# Patient Record
Sex: FEMALE | Race: WHITE | Employment: UNEMPLOYED | URBAN - METROPOLITAN AREA
[De-identification: names, ages, dates, MRNs, and addresses within clinical notes are randomized per-mention and may not be internally consistent; named-entity substitution may affect disease eponyms.]

---

## 2019-05-08 ENCOUNTER — TELEPHONE (OUTPATIENT)
Dept: INTERNAL MEDICINE CLINIC | Facility: CLINIC | Age: 59
End: 2019-05-08

## 2019-05-08 ENCOUNTER — OFFICE VISIT (OUTPATIENT)
Dept: INTERNAL MEDICINE CLINIC | Facility: CLINIC | Age: 59
End: 2019-05-08
Payer: COMMERCIAL

## 2019-05-08 VITALS
HEART RATE: 111 BPM | TEMPERATURE: 98 F | SYSTOLIC BLOOD PRESSURE: 129 MMHG | HEIGHT: 61 IN | DIASTOLIC BLOOD PRESSURE: 90 MMHG | WEIGHT: 124.81 LBS | BODY MASS INDEX: 23.56 KG/M2

## 2019-05-08 DIAGNOSIS — R10.9 LEFT SIDED ABDOMINAL PAIN: ICD-10-CM

## 2019-05-08 DIAGNOSIS — F10.10 ALCOHOL ABUSE, DAILY USE: ICD-10-CM

## 2019-05-08 DIAGNOSIS — Z12.11 SCREENING FOR COLON CANCER: ICD-10-CM

## 2019-05-08 DIAGNOSIS — K21.9 GASTROESOPHAGEAL REFLUX DISEASE, ESOPHAGITIS PRESENCE NOT SPECIFIED: ICD-10-CM

## 2019-05-08 DIAGNOSIS — Z72.0 TOBACCO ABUSE: ICD-10-CM

## 2019-05-08 DIAGNOSIS — F10.20 UNCOMPLICATED ALCOHOL DEPENDENCE (HCC): ICD-10-CM

## 2019-05-08 DIAGNOSIS — G89.29 CHRONIC PAIN OF LEFT KNEE: Primary | ICD-10-CM

## 2019-05-08 DIAGNOSIS — R19.8 ALTERNATING CONSTIPATION AND DIARRHEA: ICD-10-CM

## 2019-05-08 DIAGNOSIS — M25.552 PAIN OF LEFT HIP JOINT: ICD-10-CM

## 2019-05-08 DIAGNOSIS — M25.562 CHRONIC PAIN OF LEFT KNEE: Primary | ICD-10-CM

## 2019-05-08 PROBLEM — F41.9 ANXIETY: Status: ACTIVE | Noted: 2019-05-08

## 2019-05-08 PROCEDURE — 99204 OFFICE O/P NEW MOD 45 MIN: CPT | Performed by: INTERNAL MEDICINE

## 2019-05-08 PROCEDURE — 99212 OFFICE O/P EST SF 10 MIN: CPT | Performed by: INTERNAL MEDICINE

## 2019-05-08 RX ORDER — OMEPRAZOLE 40 MG/1
40 CAPSULE, DELAYED RELEASE ORAL DAILY
Qty: 90 CAPSULE | Refills: 1 | Status: ON HOLD | OUTPATIENT
Start: 2019-05-08 | End: 2019-12-17

## 2019-05-08 RX ORDER — TRAMADOL HYDROCHLORIDE 50 MG/1
50 TABLET ORAL EVERY 12 HOURS PRN
Qty: 30 TABLET | Refills: 1 | Status: SHIPPED | OUTPATIENT
Start: 2019-05-08 | End: 2019-05-23

## 2019-05-08 RX ORDER — LORAZEPAM 1 MG/1
1 TABLET ORAL DAILY PRN
Qty: 30 TABLET | Refills: 0 | Status: SHIPPED | OUTPATIENT
Start: 2019-05-08 | End: 2019-07-08

## 2019-05-08 NOTE — TELEPHONE ENCOUNTER
Pt's spouse called in stating that per insurance they are requesting Dr. Terese Fierro office contact insurance for approval on the CT Abdomen + Pelvis. Dago Alfaro was informed of this by central scheduling when he attempted to schedule appt. Please advise and call back.

## 2019-05-08 NOTE — ASSESSMENT & PLAN NOTE
Stressors at home. Patient drinking 2 deal with her anxiety and depression. Advised to quit alcohol use. Short supply of lorazepam nightly. Side effects of chronic use of benzodiazepines discussed.   Side effects of medication including drowsiness, somn

## 2019-05-08 NOTE — ASSESSMENT & PLAN NOTE
2 drinks of huseyin per day. Counseled on alcohol abstinence. Informed that it could be causing peptic ulcer disease and her abdominal symptoms. Advised to focus on lifestyle modification. Involving hobbies and activities.   Eat healthy and exercise regu

## 2019-05-08 NOTE — TELEPHONE ENCOUNTER
called stating no rx was provided for pts pain and rx for       LORazepam 1 MG Oral Tab Take 1 tablet (1 mg total) by mouth daily as needed for Anxiety.  Disp: 30 tablet Rfl: 0     Has not been called in please advise

## 2019-05-09 ENCOUNTER — TELEPHONE (OUTPATIENT)
Dept: OTHER | Age: 59
End: 2019-05-09

## 2019-05-09 ENCOUNTER — HOSPITAL ENCOUNTER (OUTPATIENT)
Dept: GENERAL RADIOLOGY | Facility: HOSPITAL | Age: 59
Discharge: HOME | End: 2019-05-09
Attending: INTERNAL MEDICINE
Payer: COMMERCIAL

## 2019-05-09 ENCOUNTER — LAB ENCOUNTER (OUTPATIENT)
Dept: LAB | Facility: HOSPITAL | Age: 59
End: 2019-05-09
Attending: INTERNAL MEDICINE
Payer: COMMERCIAL

## 2019-05-09 DIAGNOSIS — M25.562 CHRONIC PAIN OF LEFT KNEE: ICD-10-CM

## 2019-05-09 DIAGNOSIS — E87.6 HYPOKALEMIA: ICD-10-CM

## 2019-05-09 DIAGNOSIS — R19.8 ALTERNATING CONSTIPATION AND DIARRHEA: ICD-10-CM

## 2019-05-09 DIAGNOSIS — D75.89 MACROCYTOSIS WITHOUT ANEMIA: ICD-10-CM

## 2019-05-09 DIAGNOSIS — R10.9 LEFT SIDED ABDOMINAL PAIN: ICD-10-CM

## 2019-05-09 DIAGNOSIS — E87.6 HYPOKALEMIA: Primary | ICD-10-CM

## 2019-05-09 DIAGNOSIS — F10.20 UNCOMPLICATED ALCOHOL DEPENDENCE (HCC): ICD-10-CM

## 2019-05-09 DIAGNOSIS — M25.552 PAIN OF LEFT HIP JOINT: ICD-10-CM

## 2019-05-09 DIAGNOSIS — G89.29 CHRONIC PAIN OF LEFT KNEE: ICD-10-CM

## 2019-05-09 PROCEDURE — 83735 ASSAY OF MAGNESIUM: CPT

## 2019-05-09 PROCEDURE — 73502 X-RAY EXAM HIP UNI 2-3 VIEWS: CPT | Performed by: INTERNAL MEDICINE

## 2019-05-09 PROCEDURE — 80053 COMPREHEN METABOLIC PANEL: CPT

## 2019-05-09 PROCEDURE — 85060 BLOOD SMEAR INTERPRETATION: CPT

## 2019-05-09 PROCEDURE — 82607 VITAMIN B-12: CPT

## 2019-05-09 PROCEDURE — 82746 ASSAY OF FOLIC ACID SERUM: CPT

## 2019-05-09 PROCEDURE — 73562 X-RAY EXAM OF KNEE 3: CPT | Performed by: INTERNAL MEDICINE

## 2019-05-09 PROCEDURE — 36415 COLL VENOUS BLD VENIPUNCTURE: CPT

## 2019-05-09 PROCEDURE — 85025 COMPLETE CBC W/AUTO DIFF WBC: CPT

## 2019-05-09 NOTE — TELEPHONE ENCOUNTER
Auth required though [employer]. Referral was just entered. Review can take 15 + business days for the authorization. Patient will be notified when approved.     Thank you,  [facility]

## 2019-05-13 ENCOUNTER — TELEPHONE (OUTPATIENT)
Dept: INTERNAL MEDICINE CLINIC | Facility: CLINIC | Age: 59
End: 2019-05-13

## 2019-05-22 ENCOUNTER — LAB ENCOUNTER (OUTPATIENT)
Dept: LAB | Facility: HOSPITAL | Age: 59
End: 2019-05-22
Attending: INTERNAL MEDICINE
Payer: COMMERCIAL

## 2019-05-22 DIAGNOSIS — E87.6 HYPOKALEMIA: ICD-10-CM

## 2019-05-22 LAB
ANION GAP SERPL CALC-SCNC: 7 MMOL/L (ref 0–18)
BUN BLD-MCNC: 6 MG/DL (ref 7–18)
BUN/CREAT SERPL: 10.9 (ref 10–20)
CALCIUM BLD-MCNC: 9.2 MG/DL (ref 8.5–10.1)
CHLORIDE SERPL-SCNC: 109 MMOL/L (ref 98–112)
CO2 SERPL-SCNC: 27 MMOL/L (ref 21–32)
CREAT BLD-MCNC: 0.55 MG/DL (ref 0.55–1.02)
GLUCOSE BLD-MCNC: 94 MG/DL (ref 70–99)
OSMOLALITY SERPL CALC.SUM OF ELEC: 293 MOSM/KG (ref 275–295)
PATIENT FASTING: NO
POTASSIUM SERPL-SCNC: 3.7 MMOL/L (ref 3.5–5.1)
SODIUM SERPL-SCNC: 143 MMOL/L (ref 136–145)

## 2019-05-22 PROCEDURE — 36415 COLL VENOUS BLD VENIPUNCTURE: CPT

## 2019-05-22 PROCEDURE — 80048 BASIC METABOLIC PNL TOTAL CA: CPT

## 2019-06-05 ENCOUNTER — OFFICE VISIT (OUTPATIENT)
Dept: ORTHOPEDICS CLINIC | Facility: CLINIC | Age: 59
End: 2019-06-05
Payer: COMMERCIAL

## 2019-06-05 VITALS — SYSTOLIC BLOOD PRESSURE: 157 MMHG | DIASTOLIC BLOOD PRESSURE: 100 MMHG | HEART RATE: 96 BPM | RESPIRATION RATE: 24 BRPM

## 2019-06-05 DIAGNOSIS — M70.62 TROCHANTERIC BURSITIS OF LEFT HIP: Primary | ICD-10-CM

## 2019-06-05 PROCEDURE — 99212 OFFICE O/P EST SF 10 MIN: CPT | Performed by: ORTHOPAEDIC SURGERY

## 2019-06-05 PROCEDURE — 99203 OFFICE O/P NEW LOW 30 MIN: CPT | Performed by: ORTHOPAEDIC SURGERY

## 2019-06-05 PROCEDURE — 20610 DRAIN/INJ JOINT/BURSA W/O US: CPT | Performed by: ORTHOPAEDIC SURGERY

## 2019-06-05 NOTE — PROGRESS NOTES
Per verbal order from VT, draw up 3ml of Kenalog 10 and 3ml of 1% lidocaine for cortisone injection to left hip.   Niraj Mendoza RN

## 2019-06-05 NOTE — H&P
Chief Complaint: Left hip and knee pain    NURSING INTAKE COMMENTS: Patient presents with:  Consult: left hip and left knee pain -- Xray taken 05/09/19. Onset over 1 year and denies injury. Rates pain 8/10.  Tried Aleve and ibuprofen but stopped due to stom electronic medical record. Pertinent positives and negatives noted in the the HPI. Physical Examination:  There is no height or weight on file to calculate BMI. This 66year old female is A&O in no acute distress.     HIP EXAM: LEFT  RIGHT   Range of

## 2019-06-05 NOTE — PROGRESS NOTES
Pt had high blood pressure both before and after cortisone injection to left hip today. Instructed pt to discuss this with her PCP. Discussed with pt the risks associated with chronic hypertension such as stroke and kidney problems.  Pt verbalized [place]

## 2019-06-05 NOTE — PROCEDURES
Procedure: The risks and benefits of a cortisone injection were discussed with the patient. An informational sheet was also provided and the patient had ample time to review it.   Under sterile preparation, the left hip bursa was injected with 30 mg of Barrera Cadet

## 2019-06-06 ENCOUNTER — TELEPHONE (OUTPATIENT)
Dept: CASE MANAGEMENT | Age: 59
End: 2019-06-06

## 2019-06-06 NOTE — TELEPHONE ENCOUNTER
Shane Bradford,     The CT you ordered for this patient requests a peer discussion. To do so, please call [phone] option 4. If this can't be done, please have you're clinical staff contact the patient with alternative medical measures.     Thank you,   Gladys Antoine

## 2019-06-07 ENCOUNTER — TELEPHONE (OUTPATIENT)
Dept: CASE MANAGEMENT | Age: 59
End: 2019-06-07

## 2019-06-07 NOTE — TELEPHONE ENCOUNTER
Shane Michel has been denied for the following reason. This can still be overturned by peer review, please see message sent yesterday for this information.  Patient will be notified and advised to contact you directly for alternative medical measure

## 2019-06-10 NOTE — TELEPHONE ENCOUNTER
Please inform the patient to follow-up with GI for left sided abdominal pain and for screening colonoscopy. Referral was given during last office visit. ..  The CT scan was not prior authorized.

## 2019-06-11 NOTE — TELEPHONE ENCOUNTER
Spoke with patient and relayed 's message.  Patient verbalized understanding and stated will make appointment with GI

## 2019-07-09 RX ORDER — LORAZEPAM 1 MG/1
TABLET ORAL
Qty: 30 TABLET | Refills: 0 | OUTPATIENT
Start: 2019-07-09 | End: 2021-01-06

## 2019-07-09 NOTE — TELEPHONE ENCOUNTER
Review pended refill request as it does not fall under a protocol.     Last Rx: 5/8/19 #30  Requested Prescriptions     Pending Prescriptions Disp Refills   • LORAZEPAM 1 MG Oral Tab [Pharmacy Med Name: LORAZEPAM   1MG     TAB] 30 tablet 0     Sig: TAKE 1 T

## 2019-11-28 ENCOUNTER — ANESTHESIA (OUTPATIENT)
Dept: SURGERY | Facility: HOSPITAL | Age: 59
DRG: 853 | End: 2019-11-28

## 2019-11-28 ENCOUNTER — APPOINTMENT (OUTPATIENT)
Dept: GENERAL RADIOLOGY | Facility: HOSPITAL | Age: 59
DRG: 853 | End: 2019-11-28
Attending: UROLOGY

## 2019-11-28 ENCOUNTER — APPOINTMENT (OUTPATIENT)
Dept: CT IMAGING | Facility: HOSPITAL | Age: 59
DRG: 853 | End: 2019-11-28
Attending: EMERGENCY MEDICINE

## 2019-11-28 ENCOUNTER — HOSPITAL ENCOUNTER (INPATIENT)
Facility: HOSPITAL | Age: 59
LOS: 9 days | Discharge: HOME | DRG: 853 | End: 2019-12-07
Attending: EMERGENCY MEDICINE | Admitting: HOSPITALIST

## 2019-11-28 ENCOUNTER — ANESTHESIA EVENT (OUTPATIENT)
Dept: SURGERY | Facility: HOSPITAL | Age: 59
DRG: 853 | End: 2019-11-28

## 2019-11-28 ENCOUNTER — APPOINTMENT (OUTPATIENT)
Dept: GENERAL RADIOLOGY | Facility: HOSPITAL | Age: 59
DRG: 853 | End: 2019-11-28
Attending: EMERGENCY MEDICINE

## 2019-11-28 DIAGNOSIS — N39.0 SEPSIS DUE TO URINARY TRACT INFECTION (HCC): ICD-10-CM

## 2019-11-28 DIAGNOSIS — A41.9 SEPSIS (HCC): ICD-10-CM

## 2019-11-28 DIAGNOSIS — A41.9 SEPSIS DUE TO URINARY TRACT INFECTION (HCC): ICD-10-CM

## 2019-11-28 DIAGNOSIS — N20.0 KIDNEY STONE: ICD-10-CM

## 2019-11-28 DIAGNOSIS — N30.00 ACUTE CYSTITIS WITHOUT HEMATURIA: Primary | ICD-10-CM

## 2019-11-28 DIAGNOSIS — E87.6 HYPOKALEMIA: ICD-10-CM

## 2019-11-28 DIAGNOSIS — F10.230 ALCOHOL WITHDRAWAL SYNDROME WITHOUT COMPLICATION (HCC): ICD-10-CM

## 2019-11-28 PROBLEM — F10.930 ALCOHOL WITHDRAWAL SYNDROME WITHOUT COMPLICATION (HCC): Status: ACTIVE | Noted: 2019-11-28

## 2019-11-28 PROCEDURE — 71046 X-RAY EXAM CHEST 2 VIEWS: CPT | Performed by: EMERGENCY MEDICINE

## 2019-11-28 PROCEDURE — 99253 IP/OBS CNSLTJ NEW/EST LOW 45: CPT | Performed by: UROLOGY

## 2019-11-28 PROCEDURE — 99223 1ST HOSP IP/OBS HIGH 75: CPT | Performed by: HOSPITALIST

## 2019-11-28 PROCEDURE — 0T778DZ DILATION OF LEFT URETER WITH INTRALUMINAL DEVICE, VIA NATURAL OR ARTIFICIAL OPENING ENDOSCOPIC: ICD-10-PCS | Performed by: UROLOGY

## 2019-11-28 PROCEDURE — BT1F1ZZ FLUOROSCOPY OF LEFT KIDNEY, URETER AND BLADDER USING LOW OSMOLAR CONTRAST: ICD-10-PCS | Performed by: UROLOGY

## 2019-11-28 PROCEDURE — 74177 CT ABD & PELVIS W/CONTRAST: CPT | Performed by: EMERGENCY MEDICINE

## 2019-11-28 PROCEDURE — 74420 UROGRAPHY RTRGR +-KUB: CPT | Performed by: UROLOGY

## 2019-11-28 PROCEDURE — 52332 CYSTOSCOPY AND TREATMENT: CPT | Performed by: UROLOGY

## 2019-11-28 DEVICE — STENT URET 6F 24CM ULSMTH: Type: IMPLANTABLE DEVICE | Status: FUNCTIONAL

## 2019-11-28 RX ORDER — HYDROCODONE BITARTRATE AND ACETAMINOPHEN 5; 325 MG/1; MG/1
1 TABLET ORAL AS NEEDED
Status: DISCONTINUED | OUTPATIENT
Start: 2019-11-28 | End: 2019-11-28 | Stop reason: HOSPADM

## 2019-11-28 RX ORDER — POTASSIUM CHLORIDE 14.9 MG/ML
20 INJECTION INTRAVENOUS ONCE
Status: COMPLETED | OUTPATIENT
Start: 2019-11-28 | End: 2019-11-28

## 2019-11-28 RX ORDER — ACETAMINOPHEN 325 MG/1
650 TABLET ORAL EVERY 6 HOURS PRN
Status: DISCONTINUED | OUTPATIENT
Start: 2019-11-28 | End: 2019-11-30

## 2019-11-28 RX ORDER — HYDROMORPHONE HYDROCHLORIDE 1 MG/ML
0.6 INJECTION, SOLUTION INTRAMUSCULAR; INTRAVENOUS; SUBCUTANEOUS EVERY 5 MIN PRN
Status: DISCONTINUED | OUTPATIENT
Start: 2019-11-28 | End: 2019-11-28 | Stop reason: HOSPADM

## 2019-11-28 RX ORDER — HYDROMORPHONE HYDROCHLORIDE 1 MG/ML
0.4 INJECTION, SOLUTION INTRAMUSCULAR; INTRAVENOUS; SUBCUTANEOUS EVERY 5 MIN PRN
Status: DISCONTINUED | OUTPATIENT
Start: 2019-11-28 | End: 2019-11-28 | Stop reason: HOSPADM

## 2019-11-28 RX ORDER — SODIUM CHLORIDE 9 MG/ML
INJECTION, SOLUTION INTRAVENOUS CONTINUOUS
Status: DISCONTINUED | OUTPATIENT
Start: 2019-11-28 | End: 2019-11-30

## 2019-11-28 RX ORDER — MORPHINE SULFATE 2 MG/ML
1 INJECTION, SOLUTION INTRAMUSCULAR; INTRAVENOUS EVERY 2 HOUR PRN
Status: DISCONTINUED | OUTPATIENT
Start: 2019-11-28 | End: 2019-11-29

## 2019-11-28 RX ORDER — IPRATROPIUM BROMIDE AND ALBUTEROL SULFATE 2.5; .5 MG/3ML; MG/3ML
3 SOLUTION RESPIRATORY (INHALATION) ONCE
Status: COMPLETED | OUTPATIENT
Start: 2019-11-28 | End: 2019-11-28

## 2019-11-28 RX ORDER — LORAZEPAM 2 MG/ML
2 INJECTION INTRAMUSCULAR
Status: DISCONTINUED | OUTPATIENT
Start: 2019-11-28 | End: 2019-12-05

## 2019-11-28 RX ORDER — ONDANSETRON 2 MG/ML
4 INJECTION INTRAMUSCULAR; INTRAVENOUS EVERY 6 HOURS PRN
Status: DISCONTINUED | OUTPATIENT
Start: 2019-11-28 | End: 2019-12-07

## 2019-11-28 RX ORDER — MULTIPLE VITAMINS W/ MINERALS TAB 9MG-400MCG
1 TAB ORAL DAILY
Status: DISCONTINUED | OUTPATIENT
Start: 2019-11-28 | End: 2019-12-07

## 2019-11-28 RX ORDER — LIDOCAINE HYDROCHLORIDE 10 MG/ML
INJECTION, SOLUTION EPIDURAL; INFILTRATION; INTRACAUDAL; PERINEURAL AS NEEDED
Status: DISCONTINUED | OUTPATIENT
Start: 2019-11-28 | End: 2019-11-28 | Stop reason: SURG

## 2019-11-28 RX ORDER — NICOTINE 21 MG/24HR
1 PATCH, TRANSDERMAL 24 HOURS TRANSDERMAL DAILY
Status: DISCONTINUED | OUTPATIENT
Start: 2019-11-28 | End: 2019-12-07

## 2019-11-28 RX ORDER — HALOPERIDOL 5 MG/ML
0.25 INJECTION INTRAMUSCULAR ONCE AS NEEDED
Status: DISCONTINUED | OUTPATIENT
Start: 2019-11-28 | End: 2019-11-28 | Stop reason: HOSPADM

## 2019-11-28 RX ORDER — HYDROMORPHONE HYDROCHLORIDE 1 MG/ML
0.2 INJECTION, SOLUTION INTRAMUSCULAR; INTRAVENOUS; SUBCUTANEOUS EVERY 5 MIN PRN
Status: DISCONTINUED | OUTPATIENT
Start: 2019-11-28 | End: 2019-11-28 | Stop reason: HOSPADM

## 2019-11-28 RX ORDER — MORPHINE SULFATE 2 MG/ML
2 INJECTION, SOLUTION INTRAMUSCULAR; INTRAVENOUS EVERY 2 HOUR PRN
Status: DISCONTINUED | OUTPATIENT
Start: 2019-11-28 | End: 2019-11-29

## 2019-11-28 RX ORDER — SODIUM CHLORIDE 0.9 % (FLUSH) 0.9 %
3 SYRINGE (ML) INJECTION AS NEEDED
Status: DISCONTINUED | OUTPATIENT
Start: 2019-11-28 | End: 2019-12-07

## 2019-11-28 RX ORDER — LORAZEPAM 1 MG/1
2 TABLET ORAL
Status: DISCONTINUED | OUTPATIENT
Start: 2019-11-28 | End: 2019-12-05

## 2019-11-28 RX ORDER — SODIUM CHLORIDE, SODIUM LACTATE, POTASSIUM CHLORIDE, CALCIUM CHLORIDE 600; 310; 30; 20 MG/100ML; MG/100ML; MG/100ML; MG/100ML
INJECTION, SOLUTION INTRAVENOUS CONTINUOUS
Status: DISCONTINUED | OUTPATIENT
Start: 2019-11-28 | End: 2019-11-28 | Stop reason: HOSPADM

## 2019-11-28 RX ORDER — MORPHINE SULFATE 4 MG/ML
4 INJECTION, SOLUTION INTRAMUSCULAR; INTRAVENOUS EVERY 2 HOUR PRN
Status: DISCONTINUED | OUTPATIENT
Start: 2019-11-28 | End: 2019-11-29

## 2019-11-28 RX ORDER — FOLIC ACID 1 MG/1
1 TABLET ORAL DAILY
Status: DISCONTINUED | OUTPATIENT
Start: 2019-11-28 | End: 2019-12-07

## 2019-11-28 RX ORDER — NALOXONE HYDROCHLORIDE 0.4 MG/ML
80 INJECTION, SOLUTION INTRAMUSCULAR; INTRAVENOUS; SUBCUTANEOUS AS NEEDED
Status: DISCONTINUED | OUTPATIENT
Start: 2019-11-28 | End: 2019-11-28 | Stop reason: HOSPADM

## 2019-11-28 RX ORDER — DEXAMETHASONE SODIUM PHOSPHATE 4 MG/ML
VIAL (ML) INJECTION AS NEEDED
Status: DISCONTINUED | OUTPATIENT
Start: 2019-11-28 | End: 2019-11-28 | Stop reason: SURG

## 2019-11-28 RX ORDER — MORPHINE SULFATE 4 MG/ML
2 INJECTION, SOLUTION INTRAMUSCULAR; INTRAVENOUS ONCE
Status: COMPLETED | OUTPATIENT
Start: 2019-11-28 | End: 2019-11-28

## 2019-11-28 RX ORDER — LORAZEPAM 1 MG/1
1 TABLET ORAL
Status: DISCONTINUED | OUTPATIENT
Start: 2019-11-28 | End: 2019-12-05

## 2019-11-28 RX ORDER — MORPHINE SULFATE 4 MG/ML
4 INJECTION, SOLUTION INTRAMUSCULAR; INTRAVENOUS EVERY 10 MIN PRN
Status: DISCONTINUED | OUTPATIENT
Start: 2019-11-28 | End: 2019-11-28 | Stop reason: HOSPADM

## 2019-11-28 RX ORDER — MORPHINE SULFATE 2 MG/ML
2 INJECTION, SOLUTION INTRAMUSCULAR; INTRAVENOUS EVERY 10 MIN PRN
Status: DISCONTINUED | OUTPATIENT
Start: 2019-11-28 | End: 2019-11-28 | Stop reason: HOSPADM

## 2019-11-28 RX ORDER — ACETAMINOPHEN 325 MG/1
325 TABLET ORAL EVERY 6 HOURS PRN
COMMUNITY
End: 2019-12-13

## 2019-11-28 RX ORDER — ONDANSETRON 2 MG/ML
4 INJECTION INTRAMUSCULAR; INTRAVENOUS ONCE AS NEEDED
Status: DISCONTINUED | OUTPATIENT
Start: 2019-11-28 | End: 2019-11-28 | Stop reason: HOSPADM

## 2019-11-28 RX ORDER — ONDANSETRON 2 MG/ML
INJECTION INTRAMUSCULAR; INTRAVENOUS AS NEEDED
Status: DISCONTINUED | OUTPATIENT
Start: 2019-11-28 | End: 2019-11-28 | Stop reason: SURG

## 2019-11-28 RX ORDER — TEMAZEPAM 7.5 MG/1
15 CAPSULE ORAL NIGHTLY PRN
Status: DISCONTINUED | OUTPATIENT
Start: 2019-11-28 | End: 2019-12-07

## 2019-11-28 RX ORDER — METOCLOPRAMIDE HYDROCHLORIDE 5 MG/ML
10 INJECTION INTRAMUSCULAR; INTRAVENOUS EVERY 8 HOURS PRN
Status: DISCONTINUED | OUTPATIENT
Start: 2019-11-28 | End: 2019-12-07

## 2019-11-28 RX ORDER — MIDAZOLAM HYDROCHLORIDE 1 MG/ML
INJECTION INTRAMUSCULAR; INTRAVENOUS AS NEEDED
Status: DISCONTINUED | OUTPATIENT
Start: 2019-11-28 | End: 2019-11-28 | Stop reason: SURG

## 2019-11-28 RX ORDER — LORAZEPAM 2 MG/ML
1 INJECTION INTRAMUSCULAR
Status: DISCONTINUED | OUTPATIENT
Start: 2019-11-28 | End: 2019-12-05

## 2019-11-28 RX ORDER — LORAZEPAM 2 MG/ML
1 INJECTION INTRAMUSCULAR ONCE
Status: COMPLETED | OUTPATIENT
Start: 2019-11-28 | End: 2019-11-28

## 2019-11-28 RX ORDER — HYDROCODONE BITARTRATE AND ACETAMINOPHEN 5; 325 MG/1; MG/1
2 TABLET ORAL AS NEEDED
Status: DISCONTINUED | OUTPATIENT
Start: 2019-11-28 | End: 2019-11-28 | Stop reason: HOSPADM

## 2019-11-28 RX ORDER — MELATONIN
100 DAILY
Status: DISCONTINUED | OUTPATIENT
Start: 2019-11-29 | End: 2019-12-07

## 2019-11-28 RX ORDER — SODIUM CHLORIDE, SODIUM LACTATE, POTASSIUM CHLORIDE, CALCIUM CHLORIDE 600; 310; 30; 20 MG/100ML; MG/100ML; MG/100ML; MG/100ML
INJECTION, SOLUTION INTRAVENOUS CONTINUOUS PRN
Status: DISCONTINUED | OUTPATIENT
Start: 2019-11-28 | End: 2019-11-28 | Stop reason: SURG

## 2019-11-28 RX ORDER — SODIUM CHLORIDE 9 MG/ML
INJECTION, SOLUTION INTRAVENOUS CONTINUOUS
Status: ACTIVE | OUTPATIENT
Start: 2019-11-28 | End: 2019-11-28

## 2019-11-28 RX ORDER — PROCHLORPERAZINE EDISYLATE 5 MG/ML
5 INJECTION INTRAMUSCULAR; INTRAVENOUS ONCE AS NEEDED
Status: DISCONTINUED | OUTPATIENT
Start: 2019-11-28 | End: 2019-11-28 | Stop reason: HOSPADM

## 2019-11-28 RX ORDER — MORPHINE SULFATE 10 MG/ML
6 INJECTION, SOLUTION INTRAMUSCULAR; INTRAVENOUS EVERY 10 MIN PRN
Status: DISCONTINUED | OUTPATIENT
Start: 2019-11-28 | End: 2019-11-28 | Stop reason: HOSPADM

## 2019-11-28 RX ADMIN — DEXAMETHASONE SODIUM PHOSPHATE 4 MG: 4 MG/ML VIAL (ML) INJECTION at 16:23:00

## 2019-11-28 RX ADMIN — ONDANSETRON 4 MG: 2 INJECTION INTRAMUSCULAR; INTRAVENOUS at 16:23:00

## 2019-11-28 RX ADMIN — MIDAZOLAM HYDROCHLORIDE 2 MG: 1 INJECTION INTRAMUSCULAR; INTRAVENOUS at 16:19:00

## 2019-11-28 RX ADMIN — LIDOCAINE HYDROCHLORIDE 50 MG: 10 INJECTION, SOLUTION EPIDURAL; INFILTRATION; INTRACAUDAL; PERINEURAL at 16:23:00

## 2019-11-28 RX ADMIN — SODIUM CHLORIDE, SODIUM LACTATE, POTASSIUM CHLORIDE, CALCIUM CHLORIDE: 600; 310; 30; 20 INJECTION, SOLUTION INTRAVENOUS at 16:12:00

## 2019-11-28 NOTE — ED NOTES
Patient complaining of pain and urge to urinate. Also getting frustrated while trying to take out all her piercings, HR rising.  Morphine ordered

## 2019-11-28 NOTE — ANESTHESIA PROCEDURE NOTES
Airway  Urgency: Elective      General Information and Staff    Patient location during procedure: OR  Anesthesiologist: Ba Romano MD  Performed: anesthesiologist     Indications and Patient Condition  Indications for airway management: anesthesia  Se

## 2019-11-28 NOTE — SEPSIS REASSESSMENT
[facility]    Sepsis Reassessment Note    BP (!) 134/96   Pulse 109   Temp 98.7 °F (37.1 °C) (Oral)   Resp (!) 28   Ht 154.9 cm (5' 1\")   Wt 50.3 kg   LMP  (LMP Unknown)   SpO2 95%   BMI 20.97 kg/m²      2:20 PM    Cardiac:  Regularity:

## 2019-11-28 NOTE — ANESTHESIA PREPROCEDURE EVALUATION
Anesthesia PreOp Note    HPI:     Feliciano Alexander is a 64year old female who presents for preoperative consultation requested by: Liu Shipman MD    Date of Surgery: 11/28/2019    Procedure(s):  CYSTOSCOPY STENT INSERTION  Indication: Sepsis ([facility] [address] [Z5 (GARAMYCIN) 120 mg in sodium chloride 0.9% 100 mL IVPB, 120 mg, Intravenous, Once, Redd Valdez MD, Last Rate: 100 mL/hr at 11/28/19 1526, 120 mg at 11/28/19 1526    No current Epic-ordered outpatient medications on file.       No Known Allergies    F organizations: Not on file        Relationship status: Not on file      Intimate partner violence:        Fear of current or ex partner: Not on file        Emotionally abused: Not on file        Physically abused: Not on file        Forced sexual activity: Anesthesia Evaluation     Patient summary reviewed    Airway   Mallampati: II  TM distance: >3 FB  Neck ROM: full  Dental - normal exam     Pulmonary - negative ROS and normal exam   Cardiovascular - normal exam    ECG reviewed    Neuro/Psych - negative RO

## 2019-11-28 NOTE — CONSULTS
[facility] Urology  Report of Initial Consultation    Ariana Board Patient Status:  Emergency    10/22/1960 MRN [id number]   Location [address] Attending Nohelia Talley MD   [facility] Day # 0 Past Surgical History:  History reviewed. No pertinent surgical history.     Family History:  Family History   Problem Relation Age of Onset   • Diabetes Maternal Grandmother    • Eye Problems Maternal Grandmother    • Breast Cancer Mother 61   • Jossue Sport Neurologic: No focal neurological deficits. Integument: No lesions. No erythema. Psychiatric: Appropriate mood and affect.     Results:     Laboratory Data:  Recent Labs   Lab 11/28/19  1211   WBC 8.3   HGB 16.1*   HCT 45.7   .0*     Recent Labs stone treatment will be deferred to a later date until she is stable and her infection is treated. Benefits, risks, possible complications and alternatives were discussed with the patient.  She elects to proceed with cystoscopy and left ureteral JJ stent pl

## 2019-11-28 NOTE — ANESTHESIA POSTPROCEDURE EVALUATION
Patient: Dino Mcclure    Procedure Summary     Date:  11/28/19 Room / Location:  [address] [place] [address] MAIN OR 14 / [address] [place] [address] MAIN OR    Anesthesia Start:  9077 Anesthesia Stop:      Procedure:  CYSTOSCOPY STENT INSERTION (Left ) Diagnosis:       Sepsis ([facility] [address]      (Sepsis ([facility] [address]

## 2019-11-28 NOTE — OPERATIVE REPORT
[facility] - [facility]   Urology Operative Note     Diana Perez Location: OR   [facility] [id number] MRN [id number]   Admission Date 11/28/2019 Operation Date 11/28/2019   Service Urology Surgeon Ev Arango MD      Primary Surgeon: Ev Arango MD monitoring devices were connected to the patient. Successful induction of general level LMA anesthesia was achieved. IV antibiotics were administered for surgical prophylaxis (IV Zosyn and gentamicin in the ER).  The patient was then positioned in dorsal li collecting system decompression in the meantime. 10 mL's of sterile water used to inflate the balloon. A StatLock was applied to the left thigh.   The patient was repositioned supine, general anesthesia was reversed, and she was successfully extubated and

## 2019-11-28 NOTE — ED NOTES
Fevers for the past 3 days. States she has been nauseous with a productive cough. Clear white sputum.  states she had a tooth removed years ago and sometimes the area swells and gets tight.

## 2019-11-28 NOTE — H&P
[facility]    PATIENT'S NAME: Polina Traylor   ATTENDING PHYSICIAN: Aida Sharma MD   PATIENT ACCOUNT#:   [id number]    LOCATION:  [place] [address]  MEDICAL RECORD #:   [id number]       YOB: 1960  ADMISSION DATE:       11/28/20 Oropharynx clear, dry mucous membranes. Eyes:  Anicteric sclerae. Pupils equal, round, and reactive to light and accommodation. Extraocular muscle movements intact. Ears, nose normal.  NECK:  Supple. No lymphadenopathy. Trachea midline.   LUNGS:  Phoebe

## 2019-11-28 NOTE — ED PROVIDER NOTES
Patient Seen in: [facility] Emergency Department      History   Patient presents with:  Fever (infectious)    Stated Complaint: fever    HPI    Patient is a 63-year-old female who presents with 3 days of left lower quadrant abdominal pain and feve equal, round, and reactive to light. Neck:      Musculoskeletal: Normal range of motion and neck supple. Vascular: No JVD. Cardiovascular:      Rate and Rhythm: Regular rhythm. Tachycardia present. Heart sounds: Normal heart sounds.  No murmur All other components within normal limits   CBC W/ DIFFERENTIAL - Abnormal; Notable for the following components:    HGB 16.1 (*)     .6 (*)     MCH 42.1 (*)     RDW-SD 57.6 (*)     .0 (*)     All other components within normal limits   PT Chest (cpt=71046)    Result Date: 11/28/2019  CONCLUSION: Normal examination.      Dictated by (CST): Jalen De Anda MD on 11/28/2019 at 14:24     Approved by (CST): Jalen De Anda MD on 11/28/2019 at 14:25          Ct Abdomen Pelvis Iv Co

## 2019-11-28 NOTE — ED NOTES
Orders for admission, patient is aware of plan and ready to go upstairs.  Any questions, please call ED RN Nikki Perez  at extension [phone]

## 2019-11-28 NOTE — ED NOTES
Preliminary differential neut - 43, bands - 43, lymph - 11, mono - 3, review macrocytosis, per Guillermo Meriwether

## 2019-11-29 PROCEDURE — 99233 SBSQ HOSP IP/OBS HIGH 50: CPT | Performed by: HOSPITALIST

## 2019-11-29 PROCEDURE — 99231 SBSQ HOSP IP/OBS SF/LOW 25: CPT | Performed by: UROLOGY

## 2019-11-29 RX ORDER — MAGNESIUM OXIDE 400 MG (241.3 MG MAGNESIUM) TABLET
800 TABLET ONCE
Status: COMPLETED | OUTPATIENT
Start: 2019-11-29 | End: 2019-11-29

## 2019-11-29 RX ORDER — IPRATROPIUM BROMIDE AND ALBUTEROL SULFATE 2.5; .5 MG/3ML; MG/3ML
3 SOLUTION RESPIRATORY (INHALATION) ONCE
Status: COMPLETED | OUTPATIENT
Start: 2019-11-29 | End: 2019-11-29

## 2019-11-29 RX ORDER — IPRATROPIUM BROMIDE AND ALBUTEROL SULFATE 2.5; .5 MG/3ML; MG/3ML
3 SOLUTION RESPIRATORY (INHALATION) EVERY 4 HOURS PRN
Status: DISCONTINUED | OUTPATIENT
Start: 2019-11-29 | End: 2019-12-01

## 2019-11-29 RX ORDER — MAGNESIUM OXIDE 400 MG (241.3 MG MAGNESIUM) TABLET
400 TABLET 2 TIMES DAILY WITH MEALS
Status: DISCONTINUED | OUTPATIENT
Start: 2019-11-29 | End: 2019-12-07

## 2019-11-29 RX ORDER — ALFUZOSIN HYDROCHLORIDE 10 MG/1
10 TABLET, EXTENDED RELEASE ORAL
Status: DISCONTINUED | OUTPATIENT
Start: 2019-11-29 | End: 2019-12-07

## 2019-11-29 NOTE — PLAN OF CARE
Patient urine output dark nga in the morning and lighter as the day progressed. Continued to strain urine. Urine output adequate. Patient c/o sore throat in the morning, no swallowing deficits.  Patient states she has not been eating much over the past co assistance  - Assess pain using appropriate pain scale  - Administer analgesics based on type and severity of pain and evaluate response  - Implement non-pharmacological measures as appropriate and evaluate response  - Consider cultural and social influenc care, etc)  - Arrange for interpreters to assist at discharge as needed  - Consider post-discharge preferences of patient/family/discharge partner  - Complete POLST form as appropriate  - Assess patient's ability to be responsible for managing their own he Assess for signs and symptoms of bleeding or hemorrhage  - Monitor labs and vital signs for trends  - Administer supportive blood products/factors, fluids and medications as ordered and appropriate  - Administer supportive blood products/factors as ordered

## 2019-11-29 NOTE — PROGRESS NOTES
[facility]    Progress Note    Angelic Britt Patient Status:  Inpatient    10/22/1960 MRN [id number]   Location [facility] 2W/SW Attending Ellen Horne MD   Hosp Day # 1 PCP Jerri Restrepo MD       Subjective:     Better addiction  -Patch  -Counseling    DVT proph: SCD    Dispo: PCU    D/w Dr. Garrett Norwood    D/w pt, her  at the bedside, and RN          Results:     Lab Results   Component Value Date    WBC 6.7 11/29/2019    HGB 12.0 11/29/2019    HCT 34.2 (L) 11/29/201 Electronically signed on 11/28/2019 at 16:56 by Loyd Tay MD  11/29/2019

## 2019-11-29 NOTE — PLAN OF CARE
Problem: GENITOURINARY - ADULT  Goal: Absence of urinary retention  Description  INTERVENTIONS:  - Assess patient’s ability to void and empty bladder  - Monitor intake/output and perform bladder scan as needed  - Follow urinary retention protocol/standar OT/PT consult to assist with strengthening/mobility  - Encourage toileting schedule  Outcome: Progressing     Problem: DISCHARGE PLANNING  Goal: Discharge to home or other facility with appropriate resources  Description  INTERVENTIONS:  - Identify barrier

## 2019-11-29 NOTE — DIETARY NOTE
ADULT NUTRITION BRIEF NOTE:    Received RN consult r/t screened at nutritional risk d/t reported 5# wt loss. Pt reports appetite/intake WNL until onset of current illness, ~3 days ago. Reported -120#. Appears well nourished with BMI WNL.  PO diet ini

## 2019-11-29 NOTE — PROGRESS NOTES
[facility]  Urology Consult Follow-Up Note    Kee Lord Patient Status:  Inpatient    10/22/1960 MRN [id number]   Location [facility] 2W/SW Attending Justice Rubinstein, MD   Hosp Day # 1 PCP Chris Kebede 11/28/2019  CONCLUSION:  1. Mildly obstructing left UVJ calculus measuring 4 mm in diameter. 2. Small nonobstructing calculi in the left kidney. 3. Diffuse hepatic steatosis.     Dictated by (CST): Tiana De Anda MD on 11/28/2019 at 13:16     Appro

## 2019-11-29 NOTE — CM/SW NOTE
SW received MDO for substance abuse/ETOH. SW spoke to RN/Rula notifying psych to consult and provide guidance and resources. SW/CM to remain available for support and/or discharge planning.      [address] [place], [place] [id number]

## 2019-11-29 NOTE — H&P
[facility]    PATIENT'S NAME: Mary Bethyao Court   ATTENDING PHYSICIAN: Enrique Hallman MD   PATIENT ACCOUNT#:   [id number]    LOCATION:  [address] [place] [address] RECORD #:   [id number]       YOB: 1960  ADMISSION DATE:       11/28/ blood pressure 134/96, pulse ox 95% on room air. HEENT:  Atraumatic. Oropharynx clear, dry mucous membranes. Eyes:  Anicteric sclerae. Pupils equal, round, and reactive to light and accommodation. Extraocular muscle movements intact.   Ears, nose brennen

## 2019-11-30 ENCOUNTER — APPOINTMENT (OUTPATIENT)
Dept: GENERAL RADIOLOGY | Facility: HOSPITAL | Age: 59
DRG: 853 | End: 2019-11-30
Attending: HOSPITALIST

## 2019-11-30 PROCEDURE — 99223 1ST HOSP IP/OBS HIGH 75: CPT | Performed by: INTERNAL MEDICINE

## 2019-11-30 PROCEDURE — 71045 X-RAY EXAM CHEST 1 VIEW: CPT | Performed by: HOSPITALIST

## 2019-11-30 PROCEDURE — 99233 SBSQ HOSP IP/OBS HIGH 50: CPT | Performed by: HOSPITALIST

## 2019-11-30 PROCEDURE — 99232 SBSQ HOSP IP/OBS MODERATE 35: CPT | Performed by: UROLOGY

## 2019-11-30 RX ORDER — ACETYLCYSTEINE 200 MG/ML
70 SOLUTION ORAL; RESPIRATORY (INHALATION) EVERY 4 HOURS
Status: DISPENSED | OUTPATIENT
Start: 2019-11-30 | End: 2019-12-03

## 2019-11-30 RX ORDER — POTASSIUM CHLORIDE 20 MEQ/1
40 TABLET, EXTENDED RELEASE ORAL EVERY 4 HOURS
Status: ACTIVE | OUTPATIENT
Start: 2019-11-30 | End: 2019-11-30

## 2019-11-30 RX ORDER — ACETYLCYSTEINE 200 MG/ML
140 SOLUTION ORAL; RESPIRATORY (INHALATION) ONCE
Status: COMPLETED | OUTPATIENT
Start: 2019-11-30 | End: 2019-11-30

## 2019-11-30 RX ORDER — SODIUM CHLORIDE 9 MG/ML
INJECTION, SOLUTION INTRAVENOUS CONTINUOUS
Status: DISCONTINUED | OUTPATIENT
Start: 2019-11-30 | End: 2019-12-05

## 2019-11-30 NOTE — CONSULTS
[facility]  Report of Consultation    Diana Perez Patient Status:  Inpatient    10/22/1960 MRN [id number]   Location [facility] 2W/SW Attending Adarsh Carter MD   Hosp Day # 2 PCP Kelton Palacio MD     Reason for Consultation: Maternal Grandfather         epilepsy   • Dementia Paternal Grandfather    • Colon Cancer Paternal Grandfather    • Pulmonary Disease Paternal Grandfather    • Infectious Disease Paternal Grandfather         TB   • Hypertension Other         family h/o minerals (ADULT) tab 1 tablet, 1 tablet, Oral, Daily  •  folic acid (FOLVITE) tab 1 mg, 1 mg, Oral, Daily    Physical Exam:  Blood pressure 131/85, pulse 73, temperature 98.4 °F (36.9 °C), temperature source Temporal, resp.  rate (!) 28, height 5' 1\" (1.54 for liver failure. Patient should stop consuming all alcohol. I have advised that she discontinue Tylenol as well. Plan  - lab work now, trend AST/ALT  - liver ultrasound  - stop tylenol  - stop all alcohol/rehab program      Keeley Stoddard  11/30

## 2019-11-30 NOTE — PROGRESS NOTES
[facility] - [facility]    Progress Note    Chrissy Pod Patient Status:  Inpatient    10/22/1960 MRN [id number]   Location [facility] 2W/SW Attending Davis Donovan MD   Hosp Day # 2 PCP Aj Ontiveros MD       Subjective:     Had a outpatient setting  -Repeat blood culture  -ID consult    ETOH dependence, withdrawal  -CIWA, placed on Precedex drip at night  -SW for resources  -MVI/thiamine/FA    Thrombocytopenia  Suspect combination of sepsis and ETOH  -Stable, monitor for now    Abn

## 2019-11-30 NOTE — PROGRESS NOTES
[facility]  Urology Consult Follow-Up Note    Redge Scale Patient Status:  Inpatient    10/22/1960 MRN [id number]   Location [facility] 2W/SW Attending Benny Baptiste MD   Hosp Day # 2 PCP Daisy King >=32 Resistant      Ampicillin + Sulbactam >=32 Resistant      Cefazolin 16 Sensitive      Ciprofloxacin <=0.25 Sensitive      Gentamicin <=1 Sensitive      Meropenem <=0.25 Sensitive      Levofloxacin <=0.12 Sensitive      Nitrofurantoin <=16 Sensitive or further imaging. Rest of care per primary team.    Will require definitive stone management at a later date in an outpatient setting. Discussed with patient,  at bedside, RN, and hospitalist Dr. Rakesh Buckner.     At the end of the visit, 25 min

## 2019-11-30 NOTE — PLAN OF CARE
Patient in notable respiratory distress and tachycardic during start of shift. POC discussed with Dr. Theresa Alexander and Dr. Albarran Parents throughout night. IVF rate decreased r/t respiratory distress, O2 demand increased, neb treatments, Precedex drip initiated.  See CIW cultural and social influences on pain and pain management  - Manage/alleviate anxiety  - Utilize distraction and/or relaxation techniques  - Monitor for opioid side effects  - Notify MD/LIP if interventions unsuccessful or patient reports new pain  - Anti patient needs post-hospital services based on physician/LIP order or complex needs related to functional status, cognitive ability or social support system  Outcome: Progressing     Problem: Altered Communication/Language Barrier  Goal: Patient/Family is a platelets)  INTERVENTIONS:  - Avoid intramuscular injections, enemas and rectal medication administration  - Ensure safe mobilization of patient  - Hold pressure on venipuncture sites to achieve adequate hemostasis  - Assess for signs and symptoms of inter

## 2019-12-01 ENCOUNTER — APPOINTMENT (OUTPATIENT)
Dept: ULTRASOUND IMAGING | Facility: HOSPITAL | Age: 59
DRG: 853 | End: 2019-12-01
Attending: INTERNAL MEDICINE

## 2019-12-01 ENCOUNTER — APPOINTMENT (OUTPATIENT)
Dept: GENERAL RADIOLOGY | Facility: HOSPITAL | Age: 59
DRG: 853 | End: 2019-12-01
Attending: HOSPITALIST

## 2019-12-01 PROCEDURE — 99233 SBSQ HOSP IP/OBS HIGH 50: CPT | Performed by: HOSPITALIST

## 2019-12-01 PROCEDURE — 76705 ECHO EXAM OF ABDOMEN: CPT | Performed by: INTERNAL MEDICINE

## 2019-12-01 PROCEDURE — 71045 X-RAY EXAM CHEST 1 VIEW: CPT | Performed by: HOSPITALIST

## 2019-12-01 PROCEDURE — 99232 SBSQ HOSP IP/OBS MODERATE 35: CPT | Performed by: INTERNAL MEDICINE

## 2019-12-01 PROCEDURE — 99233 SBSQ HOSP IP/OBS HIGH 50: CPT | Performed by: INTERNAL MEDICINE

## 2019-12-01 PROCEDURE — 99231 SBSQ HOSP IP/OBS SF/LOW 25: CPT | Performed by: UROLOGY

## 2019-12-01 RX ORDER — HYDRALAZINE HYDROCHLORIDE 20 MG/ML
5 INJECTION INTRAMUSCULAR; INTRAVENOUS EVERY 6 HOURS PRN
Status: DISCONTINUED | OUTPATIENT
Start: 2019-12-01 | End: 2019-12-07

## 2019-12-01 RX ORDER — FUROSEMIDE 10 MG/ML
20 INJECTION INTRAMUSCULAR; INTRAVENOUS ONCE
Status: COMPLETED | OUTPATIENT
Start: 2019-12-01 | End: 2019-12-01

## 2019-12-01 RX ORDER — METRONIDAZOLE 500 MG/100ML
500 INJECTION, SOLUTION INTRAVENOUS EVERY 8 HOURS
Status: DISCONTINUED | OUTPATIENT
Start: 2019-12-01 | End: 2019-12-04

## 2019-12-01 RX ORDER — POTASSIUM CHLORIDE 14.9 MG/ML
20 INJECTION INTRAVENOUS ONCE
Status: COMPLETED | OUTPATIENT
Start: 2019-12-01 | End: 2019-12-01

## 2019-12-01 RX ORDER — IPRATROPIUM BROMIDE AND ALBUTEROL SULFATE 2.5; .5 MG/3ML; MG/3ML
3 SOLUTION RESPIRATORY (INHALATION)
Status: DISCONTINUED | OUTPATIENT
Start: 2019-12-01 | End: 2019-12-02

## 2019-12-01 RX ORDER — IPRATROPIUM BROMIDE AND ALBUTEROL SULFATE 2.5; .5 MG/3ML; MG/3ML
3 SOLUTION RESPIRATORY (INHALATION) EVERY 6 HOURS PRN
Status: DISCONTINUED | OUTPATIENT
Start: 2019-12-01 | End: 2019-12-01

## 2019-12-01 NOTE — PLAN OF CARE
Pt continues on precedex. Tachypneic today, CXR shows RLL PNA. Urine strained, no stones identified.  at bedside, involved in care, supportive. VSS.        Problem: RISK FOR INFECTION - ADULT  Goal: Absence of fever/infection during anticipated n

## 2019-12-01 NOTE — PROGRESS NOTES
[facility]    Progress Note    Annabel Elsy Patient Status:  Inpatient    10/22/1960 MRN [id number]   Location [facility] 2W/SW Attending Izzy Lock MD   Hosp Day # 3 PCP Melania Soto MD        Subjective:     Sage Pacheco HGB 13.1 12/01/2019    HCT 37.1 12/01/2019    PLT 97.0 (L) 12/01/2019    CREATSERUM 0.52 (L) 12/01/2019    BUN 8 12/01/2019     12/01/2019    K 4.1 12/01/2019     12/01/2019    CO2 24.0 12/01/2019     (H) 12/01/2019    CA 8.2 (L) 12/01/2

## 2019-12-01 NOTE — CONSULTS
[facility] - [facility]    Report of Consultation    Mayte Desouza Patient Status:  Inpatient    10/22/1960 MRN [id number]   Location [facility] 2W/SW Attending Praful Donnelly MD   Hosp Day # 2 PCP Sara Almaguer MD     Date of Admissi History  Family History   Problem Relation Age of Onset   • Diabetes Maternal Grandmother    • Eye Problems Maternal Grandmother    • Breast Cancer Mother 61   • Breast Cancer Son         cause of death (mets to lung and brain)   • Seizure Disorder Materna Intravenous, Q1H PRN  metoprolol Tartrate (LOPRESSOR) tab 25 mg, 25 mg, Oral, BID PRN  Metoclopramide HCl (REGLAN) injection 10 mg, 10 mg, Intravenous, Q8H PRN  Thiamine HCl tab 100 mg, 100 mg, Oral, Daily  multivitamin with minerals (ADULT) tab 1 tablet, (H) 11/29/2019         Imaging  Xr Chest Ap Portable  (cpt=71045)    Result Date: 11/30/2019  CONCLUSION: Pulmonary opacity in the right lower lobe suspicious for pneumonia/infiltrate.    Dictated by (CST): Dafne Crow MD on 11/30/2019 at 16:42     Approve

## 2019-12-01 NOTE — PROGRESS NOTES
[facility]  Urology Consult Follow-Up Note    Feliciano Alexander Patient Status:  Inpatient    10/22/1960 MRN [id number]   Location [facility] 2W/SW Attending Ronald Whitfield MD   Hosp Day # 3 PCP Mary Bowman Sulbactam >=32 Resistant      Cefazolin 16 Sensitive      Ciprofloxacin <=0.25 Sensitive      Gentamicin <=1 Sensitive      Meropenem <=0.25 Sensitive      Levofloxacin <=0.12 Sensitive      Nitrofurantoin <=16 Sensitive      Piperacillin + Tazobactam 64 I withdrawal management per protocol. 5. F/U GI and pulmonary consults. Rest of care per primary team.    We will follow peripherally while in the hospital and be available if any urological questions or concerns arise.  Patient will require definitive

## 2019-12-01 NOTE — PROGRESS NOTES
[facility]     Gastroenterology Progress Note    Talya Martin Patient Status:  Inpatient    10/22/1960 MRN [id number]   Location [facility] 2W/SW Attending Ivan Mondragon MD   Hosp Day # 3 PCP Arnav Bolivar MD 165/109 — — 91 19 96 %   11/30/19 2100 (!) 149/94 — — 79 (!) 39 97 %   11/30/19 1900 (!) 153/98 99.4 °F (37.4 °C) Temporal 86 (!) 41 98 %   11/30/19 1800 139/87 — — 90 (!) 43 94 %   11/30/19 1700 (!) 137/93 — — 81 (!) 40 90 %   11/30/19 1600 (!) 137/92 98. 11/28/2019  CONCLUSION: Normal examination.      Dictated by (CST): Juana De Anda MD on 11/28/2019 at 14:24     Approved by (CST): Juana De Anda MD on 11/28/2019 at 14:25          Xr Chest Ap Portable  (cpt=71045)    Result Date: 11/30/2

## 2019-12-01 NOTE — PLAN OF CARE
Patient mostly drowsy on precedex but is easily arousable. Patient started on 7L O2 but increased to 10L after ABG results interpreted. Patient tachypnic ranging from 30's-50's RR. Dr James Combs and Dr Lachelle Gautam aware of this.  Stat chest xray obtained and ABG a toileting schedule  Outcome: Progressing     Problem: HEMATOLOGIC - ADULT  Goal: Maintains hematologic stability  Description  INTERVENTIONS  - Assess for signs and symptoms of bleeding or hemorrhage  - Monitor labs and vital signs for trends  - Administer deficit  - Monitor intake, output and patient weight  - Monitor urine specific gravity, serum osmolarity and serum sodium as indicated or ordered  - Monitor response to interventions for patient's volume status, including labs, urine output, blood pressure

## 2019-12-01 NOTE — CONSULTS
INFECTIOUS DISEASE CONSULT NOTE    Kailyn Lazar Patient Status:  Inpatient    10/22/1960 MRN [id number]   Location [facility] 2W/SW Attending Rut Smith, [facility] Se Day # 3 PCP [facility] Paternal Grandfather    • Infectious Disease Paternal Grandfather         TB   • Hypertension Other         family h/o      reports that she has been smoking. She has a 25.00 pack-year smoking history.  She has never used smokeless tobacco. She reports curr Intravenous, Q8H PRN  •  Thiamine HCl tab 100 mg, 100 mg, Oral, Daily  •  multivitamin with minerals (ADULT) tab 1 tablet, 1 tablet, Oral, Daily  •  folic acid (FOLVITE) tab 1 mg, 1 mg, Oral, Daily    Review of Systems:    REVIEW OF SYSTEMS:  Durga Gustafson 1,885*   BILT  --  2.6* 3.1* 2.5*   TP  --  4.7* 5.0* 5.2*     Microbiology: Reviewed in EMR    Radiology: Reviewed    Antibiotics:  ceftriaxone    ASSESSMENT:  64year old female w/ PMH sig for hearing impairment who was admitted with F/C, abd pain.   CT A

## 2019-12-01 NOTE — PLAN OF CARE
Patient sedated on precedex. Dr Ubaldo Rincon paged and requested if mucomyst can be converted to IV dose since it does not appear to be safe for her to take anything oral at this time.  Per Dr Ubaldo Rincon, he does not want to convert to IV and to \"turn down sedation an

## 2019-12-01 NOTE — PLAN OF CARE
Problem: GENITOURINARY - ADULT  Goal: Absence of urinary retention  Description  INTERVENTIONS:  - Assess patient’s ability to void and empty bladder  - Monitor intake/output and perform bladder scan as needed  - Follow urinary retention protocol/standar OT/PT consult to assist with strengthening/mobility  - Encourage toileting schedule  Outcome: Progressing     Problem: HEMATOLOGIC - ADULT  Goal: Maintains hematologic stability  Description  INTERVENTIONS  - Assess for signs and symptoms of bleeding or he

## 2019-12-02 ENCOUNTER — APPOINTMENT (OUTPATIENT)
Dept: GENERAL RADIOLOGY | Facility: HOSPITAL | Age: 59
DRG: 853 | End: 2019-12-02
Attending: INTERNAL MEDICINE

## 2019-12-02 ENCOUNTER — APPOINTMENT (OUTPATIENT)
Dept: GENERAL RADIOLOGY | Facility: HOSPITAL | Age: 59
DRG: 853 | End: 2019-12-02
Attending: HOSPITALIST

## 2019-12-02 PROCEDURE — 71045 X-RAY EXAM CHEST 1 VIEW: CPT | Performed by: INTERNAL MEDICINE

## 2019-12-02 PROCEDURE — 99231 SBSQ HOSP IP/OBS SF/LOW 25: CPT | Performed by: NURSE PRACTITIONER

## 2019-12-02 PROCEDURE — 99233 SBSQ HOSP IP/OBS HIGH 50: CPT | Performed by: HOSPITALIST

## 2019-12-02 PROCEDURE — 71045 X-RAY EXAM CHEST 1 VIEW: CPT | Performed by: HOSPITALIST

## 2019-12-02 PROCEDURE — 99232 SBSQ HOSP IP/OBS MODERATE 35: CPT | Performed by: PHYSICIAN ASSISTANT

## 2019-12-02 PROCEDURE — 99233 SBSQ HOSP IP/OBS HIGH 50: CPT | Performed by: INTERNAL MEDICINE

## 2019-12-02 RX ORDER — POTASSIUM CHLORIDE 14.9 MG/ML
20 INJECTION INTRAVENOUS ONCE
Status: COMPLETED | OUTPATIENT
Start: 2019-12-02 | End: 2019-12-02

## 2019-12-02 RX ORDER — POTASSIUM CHLORIDE 1.5 G/1.77G
40 POWDER, FOR SOLUTION ORAL EVERY 4 HOURS
Status: COMPLETED | OUTPATIENT
Start: 2019-12-02 | End: 2019-12-03

## 2019-12-02 RX ORDER — POTASSIUM CHLORIDE 14.9 MG/ML
20 INJECTION INTRAVENOUS ONCE
Status: DISCONTINUED | OUTPATIENT
Start: 2019-12-02 | End: 2019-12-02

## 2019-12-02 RX ORDER — IPRATROPIUM BROMIDE AND ALBUTEROL SULFATE 2.5; .5 MG/3ML; MG/3ML
3 SOLUTION RESPIRATORY (INHALATION)
Status: DISCONTINUED | OUTPATIENT
Start: 2019-12-02 | End: 2019-12-05

## 2019-12-02 NOTE — PROGRESS NOTES
[facility]    Progress Note    Dino Mcclure Patient Status:  Inpatient    10/22/1960 MRN [id number]   Location [facility] 2W/SW Attending Kal Edmond MD   Hosp Day # 4 PCP Renetta Monroy MD       Subjective:   Vance Estrada F/U ID consult recommendations. 2. Continue martinez for comfort, until patient is no longer sedated. 3. Diet as tolerated. Consider nutrition consult or alternative form of enteral nutrition if she remains sedated.     4. Alcohol withdrawal management pe Persistent left base consolidation and effusion about the same. Continued follow-up is advised.     Dictated by (CST): Vinny Gallardo MD on 12/02/2019 at 7:40     Approved by (CST): Vinny Gallardo MD on 12/02/2019 at 7:41          Xr Chest Ap Portable  (cp

## 2019-12-02 NOTE — PROGRESS NOTES
On call  from [place] night asked for a follow up on this patient and family.  and dad were in the room, patient was asleep.  is very angry with God and will share that.   offered support, empathetic listening, and praying  Wi

## 2019-12-02 NOTE — PROGRESS NOTES
[facility] ID PROGRESS NOTE    Kailyn Lazar Patient Status:  Inpatient    10/22/1960 MRN [id number]   Location [facility] 2W/SW Attending Rut Smith MD   Hosp Day # 4 PCP Amor Morrow MD     Subjective:  Awake, on sedation.   [facility]     Sepsis ([facility]      ASSESSMENT:    Antibiotics: Ceftriaxone, flagyl    64year old female w/ PMH sig for hearing impairment who was admitted with F/C, abd pain. CT A/P showed hydronephrosis with nephrolithiasis.   Pt underwent cystoscopy and had st

## 2019-12-02 NOTE — PLAN OF CARE
Precedex drip continued and titrated per protocol. While alert, Patient continues to be agitated and non-compliant. Attempting to pull on NG tube, Oxygen tubing, and Jules.  at bedside. IVF maintained. IV Flagyl. Mucomyst per NG tube.  Patient remain using appropriate pain scale  - Administer analgesics based on type and severity of pain and evaluate response  - Implement non-pharmacological measures as appropriate and evaluate response  - Consider cultural and social influences on pain and pain manage partner  - Complete POLST form as appropriate  - Assess patient's ability to be responsible for managing their own health  - Refer to Case Management Department for coordinating discharge planning if the patient needs post-hospital services based on physic and appropriate  - Administer supportive blood products/factors as ordered and appropriate  Outcome: Progressing  Goal: Free from bleeding injury  Description  (Example usage: patient with low platelets)  INTERVENTIONS:  - Avoid intramuscular injections, e

## 2019-12-02 NOTE — PROGRESS NOTES
[facility]     Gastroenterology Progress Note    Mayte Desouza Patient Status:  Inpatient    10/22/1960 MRN [id number]   Location [facility] 2W/SW Attending Praful Donnelly MD   Hosp Day # 4 PCP Sara Almaguer MD 12/02/2019    ALB 2.1 (L) 12/02/2019    ALKPHO 221 (H) 12/02/2019    BILT 2.6 (H) 12/02/2019    TP 5.2 (L) 12/02/2019     (H) 12/02/2019    ALT 1,100 (H) 12/02/2019    PTT 28.6 11/30/2019    INR 1.86 (H) 11/30/2019    TSH 1.35 10/11/2014    LIP 97 1

## 2019-12-02 NOTE — PROGRESS NOTES
[facility]    Progress Note    Duanesergio Sawant Patient Status:  Inpatient    10/22/1960 MRN [id number]   Location [facility] 2W/SW Attending Nico Camarena MD   Hosp Day # 3 PCP Yeni Brar MD       Subjective:     Jadiel Cody for possible GB disease    ETOH dependence, withdrawal  -CIWA, placed on Precedex drip    -SW for resources  -MVI/thiamine/FA    Thrombocytopenia  Suspect combination of sepsis and ETOH  -Stable, monitor for now    Abnormal liver function test  Suspect mul 12/01/2019 at 16:07          Xr Chest Ap Portable  (cpt=71045)    Result Date: 11/30/2019  CONCLUSION: Pulmonary opacity in the right lower lobe suspicious for pneumonia/infiltrate.    Dictated by (CST): Ericka Lopez MD on 11/30/2019 at 16:42     Approved b

## 2019-12-02 NOTE — PLAN OF CARE
Problem: Patient/Family Goals  Goal: Patient/Family Short Term Goal  Description  Patient's Short Term Goal: To be able to go home    Interventions:   - Advance activity as tolerated.   - Provide education to patient and spouse.  - Provide outpatient reso ordered  - Implement neutropenic guidelines  Outcome: Progressing  Note:   Afebrile, wbc WNL     Problem: SAFETY ADULT - FALL  Goal: Free from fall injury  Description  INTERVENTIONS:  - Assess pt frequently for physical needs  - Identify cognitive and phy ordered and appropriate  Outcome: Progressing  Goal: Free from bleeding injury  Description  (Example usage: patient with low platelets)  INTERVENTIONS:  - Avoid intramuscular injections, enemas and rectal medication administration  - Ensure safe mobilizat strain the patients urine. Has NGT in right nare for medication administration, patient is NPO due to her drowsiness, family has been educated that she may only have her mouth swabbed for moisture at this time due to aspiration.  Family continues to [employer]

## 2019-12-02 NOTE — PROGRESS NOTES
[facility]    Progress Note      Assessment and Plan:   1.  E. coli sepsis with obstructing left ureteric stone and hydronephrosis status post stent–the patient is improving from a septic perspective.     Recommendations: Ongoing antibiotic BUN 8 12/02/2019     12/02/2019    K 2.9 12/02/2019     12/02/2019    CO2 24.0 12/02/2019     12/02/2019    CA 8.4 12/02/2019    ALB 2.1 12/02/2019    ALKPHO 221 12/02/2019    BILT 2.6 12/02/2019    TP 5.2 12/02/2019     12/02/201

## 2019-12-03 ENCOUNTER — APPOINTMENT (OUTPATIENT)
Dept: GENERAL RADIOLOGY | Facility: HOSPITAL | Age: 59
DRG: 853 | End: 2019-12-03
Attending: CLINICAL NURSE SPECIALIST

## 2019-12-03 PROCEDURE — 99233 SBSQ HOSP IP/OBS HIGH 50: CPT | Performed by: HOSPITALIST

## 2019-12-03 PROCEDURE — 71045 X-RAY EXAM CHEST 1 VIEW: CPT | Performed by: CLINICAL NURSE SPECIALIST

## 2019-12-03 PROCEDURE — 99232 SBSQ HOSP IP/OBS MODERATE 35: CPT | Performed by: INTERNAL MEDICINE

## 2019-12-03 PROCEDURE — 99232 SBSQ HOSP IP/OBS MODERATE 35: CPT | Performed by: PHYSICIAN ASSISTANT

## 2019-12-03 RX ORDER — POTASSIUM CHLORIDE 1.5 G/1.77G
40 POWDER, FOR SOLUTION ORAL ONCE
Status: COMPLETED | OUTPATIENT
Start: 2019-12-03 | End: 2019-12-03

## 2019-12-03 NOTE — PROGRESS NOTES
Pt received tx at 13:48 and tolerated well. Her BS are coarse, rhonchi. Her Sp02 is 95% on 5 L NC. Her RR is 23, and HR is 88. Rt will cont.  tx per protocol and monitor pt.        12/03/19 1348   Respiratory Therapy / Neb Tx   Pre-Treatment Pulse 88   Pre-

## 2019-12-03 NOTE — PHYSICAL THERAPY NOTE
PHYSICAL THERAPY EVALUATION - INPATIENT     Room Number: 236/236-A  Evaluation Date: 12/3/2019  Type of Evaluation: Initial   Physician Order: PT Eval and Treat(Activity order: up with assist)    Presenting Problem: UTI, septic shock, alcohol withdrawal, Bed mobility;Transfer training;Gait training;Family education;Patient education; Energy conservation; Endurance; Body mechanics;Balance training;Strengthening;Stoop training;Stair training  Rehab Potential : Fair  Frequency (Obs): 3-5x/week       PHYSICAL THE Level of [place]: Information obtained from pt's  who was present in room. Pt was independent w/ ADLs, amb w/o AD. SUBJECTIVE  \"Can I walk to the bathroom? \"    PHYSICAL THERAPY EXAMINATION     OBJECTIVE  Precautions: (NG tube; NPO)  Fall STATUS  Gait Assessment   Gait Assistance: (Mod A x 2)  Distance (ft): 2  Assistive Device: Rolling walker     Stoop/Curb Assistance: Not tested       Bed Mobility: Min A x 1    Transfers:  Mod A x 1    Exercise/Education Provided:  Bed mobility  Body mecha

## 2019-12-03 NOTE — PROGRESS NOTES
[facility]     Gastroenterology Progress Note    Donal Shepherd Patient Status:  Inpatient    10/22/1960 MRN [id number]   Location [facility] 2W/SW Attending Meryle Guarneri, MD   Hosp Day # 5 PCP Carli Myles MD currently. Patient without further diarrhea.   Results:     Lab Results   Component Value Date    WBC 6.6 12/03/2019    HGB 11.6 (L) 12/03/2019    HCT 34.0 (L) 12/03/2019    .0 12/03/2019    CREATSERUM 0.36 (L) 12/03/2019    BUN 8 12/03/2019    NA 14 by (CST): Jalen De Anda MD on 12/01/2019 at 16:07

## 2019-12-03 NOTE — PROGRESS NOTES
Dr. Rosemary Boucher notified of pt's freuqnt multiple episodes of watery brown stool. Denies n/v. Denies abd pain. cdiff sent. Pt on enteric contact precautions. Will notify gi.no new orders received.

## 2019-12-03 NOTE — PLAN OF CARE
Problem: Patient/Family Goals  Goal: Patient/Family Long Term Goal  Description  Patient's Long Term Goal: To feel better, and prevent future admissions.     Interventions:  - Provide education  - Discuss follow-up care  - POC compliance  - See additional neutropenic period  Description  INTERVENTIONS  - Monitor WBC  - Administer growth factors as ordered  - Implement neutropenic guidelines  Outcome: Progressing     Problem: SAFETY ADULT - FALL  Goal: Free from fall injury  Description  INTERVENTIONS:  - As strategies  - Use visual cues when possible  - Listen attentively, be patient, do not interrupt  - Minimize distractions  - Allow time for understanding and response  - Establish method for patient to ask for assistance (call light)  - Provide an interpret bristle tooth brush  - Limit straining and forceful nose blowing  Outcome: Progressing     Problem: METABOLIC/FLUID AND ELECTROLYTES - ADULT  Goal: Hemodynamic stability and optimal renal function maintained  Description  INTERVENTIONS:  - Monitor labs and goal  Description  INTERVENTIONS:  - Encourage pt to monitor pain and request assistance  - Assess pain using appropriate pain scale  - Administer analgesics based on type and severity of pain and evaluate response  - Implement non-pharmacological measures to assist at discharge as needed  - Consider post-discharge preferences of patient/family/discharge partner  - Complete POLST form as appropriate  - Assess patient's ability to be responsible for managing their own health  - Refer to Case Management Depart signs for trends  - Administer supportive blood products/factors, fluids and medications as ordered and appropriate  - Administer supportive blood products/factors as ordered and appropriate  Outcome: Progressing  Goal: Free from bleeding injury  Descripti

## 2019-12-03 NOTE — OCCUPATIONAL THERAPY NOTE
OCCUPATIONAL THERAPY EVALUATION - INPATIENT     Room Number: 236/236-A  Evaluation Date: 12/3/2019  Type of Evaluation: Initial  Presenting Problem: sepsis, CIWA protocol    Physician Order: IP Consult to Occupational Therapy  Reason for Therapy: ADL/IADL Approx Degree of Impairment: 50.11% has been calculated based on documentation in the [facility] '6 clicks' Inpatient Daily Activity Short Form. Research supports that patients with this level of impairment may benefit from OPAL.      DISCHARGE RECOMMENDATIONS need for assistance and decreased awareness of need for safety  · Awareness of Errors:  assistance required to identify errors made, assistance required to correct errors made and decreased awareness of errors   · Awareness of Deficits:  decreased awarenes chair;Needs met;Call light within reach;RN aware of session/findings; Alarm set; Family present    OT Goals  Patients self stated goal is: did not state     Patient will complete functional transfer with SBA  Comment:     Patient will complete toileting with

## 2019-12-03 NOTE — PROGRESS NOTES
[place] [facility]    Progress Note    Kee Lord Patient Status:  Inpatient    10/22/1960 MRN [id number]   Location [facility] 2W/SW Attending Justice Rubinstein, MD   Hosp Day # 5 PCP Chris Kebede MD       Subjective:     off pr possible GB disease    Diarrhea today  -c.  Diff pending    ETOH dependence, withdrawal  -CIWA, off Precedex drip now  -SW for resources  -MVI/thiamine/FA  -psych consult    Thrombocytopenia  Suspect combination of sepsis and ETOH  -Stable, monitor for now (CST): Cuong De Anda MD on 12/03/2019 at 14:07          Xr Chest Ap Portable  (cpt=71045)    Result Date: 12/2/2019  CONCLUSION:   Nasogastric tube with side hole at the gastroesophageal junction and tip within the proximal stomach.   Advancement

## 2019-12-03 NOTE — PROGRESS NOTES
[place] [facility] - [facility]    Progress Note    Mirlande Snell Patient Status:  Inpatient    10/22/1960 MRN [id number]   Location [facility] 2W/SW Attending Viktor Pierre MD   Hosp Day # 4 PCP Rich Turner MD       Subjective:     Berenice Bain -->flagyl iv added 12/1 for possible GB disease    ETOH dependence, withdrawal  -CIWA, placed on Precedex drip -->wean off   -SW for resources  -MVI/thiamine/FA    Thrombocytopenia  Suspect combination of sepsis and ETOH  -Stable, monitor for now    Abnorm airspace opacity which may represent atelectasis or pneumonia is new since 11/30/2019 but unchanged since 12/1/2019.     Dictated by (CST): Krystal Cheng MD on 12/02/2019 at 9:03     Approved by (CST): Krystal Cheng MD on 12/02/2019 at 9:07          Xr Chest

## 2019-12-03 NOTE — PLAN OF CARE
Skin check upon transfer. Double RN skin check done prior to transfer off unit. Skin check performed by this RN and Sushma Flores RN. No skin issues noted. Will remain available for any further questions or concerns.     Transferred to [facility] via bed accompanied

## 2019-12-03 NOTE — PROGRESS NOTES
[facility]     Progress Note        Jimbo Dahl Patient Status:  Inpatient    10/22/1960 MRN [id number]   Location [facility] 2W/SW Attending Chance Walker MD   Hosp Day # 5 PCP Alexandro Atkins MD       Subjective:   Jeremias Albrecht (ATIVAN) injection 1 mg, 1 mg, Intravenous, Q1H PRN    Or  LORazepam (ATIVAN) tab 2 mg, 2 mg, Oral, Q1H PRN    Or  LORazepam (ATIVAN) injection 2 mg, 2 mg, Intravenous, Q1H PRN  Metoclopramide HCl (REGLAN) injection 10 mg, 10 mg, Intravenous, Q8H PRN  Thia opacity which may represent atelectasis or pneumonia is new since 11/30/2019 but unchanged since 12/1/2019.     Dictated by (CST): Brandon Thompson MD on 12/02/2019 at 9:03     Approved by (CST): Brandon Thompson MD on 12/02/2019 at 9:07          Xr Chest Ap Portab Shayna Gonzales, [address] [facility]

## 2019-12-03 NOTE — SLP NOTE
ADULT SWALLOWING EVALUATION    ASSESSMENT    ASSESSMENT/OVERALL IMPRESSION:  Pt seen sitting upright in bed for all PO trials and evaluation. Pt's  at bedside and assisted in providing background information and hx prior to this admission.  Pt with s List  Principal Problem:    Acute cystitis without hematuria  Active Problems:    Kidney stone    Hypokalemia    Sepsis due to urinary tract infection ([facility]    Alcohol withdrawal syndrome without complication (HCC)    Sepsis (HCC)    Transaminitis      Pas Swallow Study is required to rule-out silent aspiration.)    Esophageal Phase of Swallow: No complaints consistent with possible esophageal involvement    GOALS  Goal #1 The patient will tolerate mechanical soft (chopped) consistency and nectar liquids wit

## 2019-12-03 NOTE — BH PROGRESS NOTE
Psych Liaison spoke with [facility] RN Pako Arce ([id number]) at [phone] to let her know Psych Liaison will see [facility] tomorrow 12/4/19     Nimo misti [address] [place] [address]

## 2019-12-04 PROCEDURE — 99233 SBSQ HOSP IP/OBS HIGH 50: CPT | Performed by: HOSPITALIST

## 2019-12-04 PROCEDURE — 99232 SBSQ HOSP IP/OBS MODERATE 35: CPT | Performed by: INTERNAL MEDICINE

## 2019-12-04 RX ORDER — METRONIDAZOLE 500 MG/100ML
500 INJECTION, SOLUTION INTRAVENOUS EVERY 8 HOURS
Status: COMPLETED | OUTPATIENT
Start: 2019-12-04 | End: 2019-12-04

## 2019-12-04 RX ORDER — ENOXAPARIN SODIUM 100 MG/ML
40 INJECTION SUBCUTANEOUS DAILY
Status: DISCONTINUED | OUTPATIENT
Start: 2019-12-04 | End: 2019-12-07

## 2019-12-04 RX ORDER — POTASSIUM CHLORIDE 14.9 MG/ML
20 INJECTION INTRAVENOUS ONCE
Status: COMPLETED | OUTPATIENT
Start: 2019-12-04 | End: 2019-12-04

## 2019-12-04 NOTE — PROGRESS NOTES
[facility] ID PROGRESS NOTE    Thomas Danger Patient Status:  Inpatient    10/22/1960 MRN [id number]   Location [facility] 2W/SW Attending Saranya Ron MD   Hosp Day # 5 PCP Franklin Pacheco MD     Subjective:  Patient with still poor appetite. hematuria     Left ureteral stone     Septic shock due to urinary tract infection ([address]     Kidney stone     Hypokalemia     Sepsis due to urinary tract infection (HCC)     Alcohol withdrawal syndrome without complication ([address]     Sepsis ([address]     Transam

## 2019-12-04 NOTE — PROGRESS NOTES
Double RN skin check done prior to transfer off Unit. Skin check performed by this RN and Diony Soler. Wounds are as follows: generalized bruising. Reddened buttocks. Will remain available for any further questions or concerns.

## 2019-12-04 NOTE — PLAN OF CARE
Patient comfortable and resting in bed throughout night. No complaints of pain, nausea, SOB. Patient questioning all medication, stating she does not want \"much more of that medication\" referring to sedatives. No Ativan or PRNs given overnight.   c management  - Manage/alleviate anxiety  - Utilize distraction and/or relaxation techniques  - Monitor for opioid side effects  - Notify MD/LIP if interventions unsuccessful or patient reports new pain  - Anticipate increased pain with activity and pre-medi physician/LIP order or complex needs related to functional status, cognitive ability or social support system  Outcome: Progressing     Problem: Altered Communication/Language Barrier  Goal: Patient/Family is able to understand and participate in their car injections, enemas and rectal medication administration  - Ensure safe mobilization of patient  - Hold pressure on venipuncture sites to achieve adequate hemostasis  - Assess for signs and symptoms of internal bleeding  - Monitor lab trends  - Patient is t

## 2019-12-04 NOTE — PHYSICAL THERAPY NOTE
PHYSICAL THERAPY TREATMENT NOTE - INPATIENT     Room Number: 569/193-N       Presenting Problem: UTI, septic shock, alcohol withdrawal, CIWA, emergent cystoscopy w/ stent insertion on 11/28    Problem List  Principal Problem:    Acute cystitis without hema mechanics;Balance training;Strengthening;Stoop training;Stair training    SUBJECTIVE  Pt was agreeable to therapy.       WEIGHT BEARING RESTRICTION                   PAIN ASSESSMENT              BALANCE modified independent with walker - rolling      Goal #2  Current Status Min A x 1   Goal #3 Patient is able to ambulate 300 feet with assist device: walker - rolling at assistance level: modified independent   Goal #3   Current Status Amb 100 ft w/ RW & Mi

## 2019-12-04 NOTE — PROGRESS NOTES
[facility]  Hospitalist Progress Note     Kee Lord Patient Status:  Inpatient    10/22/1960  64year old CSN [id number]   Location 236236-A Attending Dbei Irene MD   Hosp Day # 6 PCP Chris Kebede MD     ASSESSMENT/PLAN    E. HEENT: NCAT, neck supple, no carotid bruit. CV: Mildly tachycardic, S1S2, and intact distal pulses. No gallop, rub, murmur. Pulm: Effort and breath sounds normal. No distress, wheezes, rales, rhonchi.   Abd: Soft, NTND, BS normal, no mass, no HSM, no re Metoclopramide HCl    >35min spent, >50% spent counseling and coordinating care in the form of educating pt/family and d/w consultants and staff. Most the time spent discussing plan for continued inpatient treatment, transfer to the floor.   We discussed n

## 2019-12-04 NOTE — PROGRESS NOTES
[facility]     Progress Note        Dino Mcclure Patient Status:  Inpatient    10/22/1960 MRN [id number]   Location [facility] 2W/SW Attending Kal Edmond MD   Hosp Day # 6 PCP Renetta Monroy MD       Subjective:   Clint Villarreal 1 mg, 1 mg, Intravenous, Q1H PRN    Or  LORazepam (ATIVAN) tab 2 mg, 2 mg, Oral, Q1H PRN    Or  LORazepam (ATIVAN) injection 2 mg, 2 mg, Intravenous, Q1H PRN  Metoclopramide HCl (REGLAN) injection 10 mg, 10 mg, Intravenous, Q8H PRN  Thiamine HCl tab 100 mg Underwent cystoscopy and stent placement 11/28/2019 by urology. Hemodynamically improved. Lactic acidosis improved. -Evidence of E. coli UTI and bacteremia.   Continue antibiotic therapy.  -Pain control  -Appears to be improved from a withdrawal standpoi

## 2019-12-04 NOTE — PROGRESS NOTES
[facility] ID PROGRESS NOTE    Yazmin Borrero Patient Status:  Inpatient    10/22/1960 MRN [id number]   Location [facility] 2W/SW Attending Jamari Norwood MD   Hosp Day # 6 PCP Demetrice White MD     Subjective:  Awake, BMs improved.  None since y Alcohol withdrawal syndrome without complication (HCC)     Sepsis (HCC)     Transaminitis      ASSESSMENT:    Antibiotics: Ceftriaxone, flagyl    64year old female w/ PMH sig for hearing impairment who was admitted with F/C, abd pain.   CT A/P showed hydr

## 2019-12-04 NOTE — OCCUPATIONAL THERAPY NOTE
OCCUPATIONAL THERAPY TREATMENT NOTE - INPATIENT        Room Number: 098/305-A           Presenting Problem: sepsis, CIWA protocol    Problem List  Principal Problem:    Acute cystitis without hematuria  Active Problems:    Kidney stone    Hypokalemia    Se care/supervision;Sub-acute rehabilitation  OT Device Recommendations: Reacher;Grab bars; Shower chair     PLAN  OT Treatment Plan: Balance activities; ADL training;Functional transfer training; Endurance training    SUBJECTIVE  'I am so tired'     OBJECTIVE Patient End of Session: In bed;Needs met;Call light within reach;RN aware of session/findings; Family present    OT Goals:  Patient will complete functional transfer with SBA  Comment: min assist sit to stand from chair    Patient will complete toileting

## 2019-12-05 PROCEDURE — 99232 SBSQ HOSP IP/OBS MODERATE 35: CPT | Performed by: INTERNAL MEDICINE

## 2019-12-05 PROCEDURE — 99233 SBSQ HOSP IP/OBS HIGH 50: CPT | Performed by: HOSPITALIST

## 2019-12-05 RX ORDER — POTASSIUM CHLORIDE 20 MEQ/1
40 TABLET, EXTENDED RELEASE ORAL EVERY 4 HOURS
Status: COMPLETED | OUTPATIENT
Start: 2019-12-05 | End: 2019-12-05

## 2019-12-05 RX ORDER — LORAZEPAM 2 MG/ML
1 INJECTION INTRAMUSCULAR EVERY 4 HOURS PRN
Status: DISCONTINUED | OUTPATIENT
Start: 2019-12-05 | End: 2019-12-06

## 2019-12-05 RX ORDER — IPRATROPIUM BROMIDE AND ALBUTEROL SULFATE 2.5; .5 MG/3ML; MG/3ML
3 SOLUTION RESPIRATORY (INHALATION) EVERY 6 HOURS PRN
Status: DISCONTINUED | OUTPATIENT
Start: 2019-12-05 | End: 2019-12-07

## 2019-12-05 RX ORDER — IPRATROPIUM BROMIDE AND ALBUTEROL SULFATE 2.5; .5 MG/3ML; MG/3ML
3 SOLUTION RESPIRATORY (INHALATION)
Status: DISCONTINUED | OUTPATIENT
Start: 2019-12-05 | End: 2019-12-07

## 2019-12-05 NOTE — PROGRESS NOTES
[facility]     [facility] Note        Alex Valdez Patient Status:  Inpatient    10/22/1960 MRN [id number]   Location [facility] 2W/SW Attending Sveta Mccormack MD   Hosp Day # 7 PCP Espinoaz Thornton MD       Subjective:   Jesusita Zarate Or  LORazepam (ATIVAN) tab 2 mg, 2 mg, Oral, Q1H PRN    Or  LORazepam (ATIVAN) injection 2 mg, 2 mg, Intravenous, Q1H PRN  Metoclopramide HCl (REGLAN) injection 10 mg, 10 mg, Intravenous, Q8H PRN  Thiamine HCl tab 100 mg, 100 mg, Oral, Daily  multivitamin Justa Araya, [address] [facility]

## 2019-12-05 NOTE — PLAN OF CARE
Problem: Patient/Family Goals  Goal: Patient/Family Long Term Goal  Description  Patient's Long Term Goal: To feel better, and prevent future admissions.     Interventions:  - Provide education  - Discuss follow-up care  - POC compliance  - See additional neutropenic period  Description  INTERVENTIONS  - Monitor WBC  - Administer growth factors as ordered  - Implement neutropenic guidelines  Outcome: Progressing     Problem: SAFETY ADULT - FALL  Goal: Free from fall injury  Description  INTERVENTIONS:  - As strategies  - Use visual cues when possible  - Listen attentively, be patient, do not interrupt  - Minimize distractions  - Allow time for understanding and response  - Establish method for patient to ask for assistance (call light)  - Provide an interpret bristle tooth brush  - Limit straining and forceful nose blowing  Outcome: Progressing     Problem: METABOLIC/FLUID AND ELECTROLYTES - ADULT  Goal: Hemodynamic stability and optimal renal function maintained  Description  INTERVENTIONS:  - Monitor labs and

## 2019-12-05 NOTE — PROGRESS NOTES
[place] [facility]  Hospitalist Progress Note     Marskoringladis Dahl Patient Status:  Inpatient    10/22/1960  64year old [facility] [id number]   Location 236/236-A Attending Kwesi Marroquin MD   Hosp Day # 7 PCP Alexandro Atkins MD     ASSESSMENT/PLAN    E. HEENT: NCAT, neck supple, no carotid bruit. CV: Mildly tachycardic, S1S2, and intact distal pulses. No gallop, rub, murmur. Pulm: Effort and breath sounds normal. No distress, wheezes, rales, rhonchi.   Abd: Soft, NTND, BS normal, no mass, no HSM, no re 1 patch Transdermal Daily   • Thiamine HCl  100 mg Oral Daily   • multivitamin with minerals  1 tablet Oral Daily   • folic acid  1 mg Oral Daily     ipratropium-albuterol, metoprolol Tartrate, hydrALAzine HCl, Normal Saline Flush, ondansetron HCl, temazep

## 2019-12-05 NOTE — SLP NOTE
SPEECH DAILY NOTE - INPATIENT    ASSESSMENT & PLAN   ASSESSMENT      Pt alert, afebrile and on 5L/min 02 via HF NC. Pt seen for swallow analysis per BSE recommendations (after consulting with RN).  Pt observed upright in bed with current diet of chopped/nec rate;Small bites and sips; No straw  Medication Administration Recommendations: Crushed in puree    Patient Experiencing Pain: No                Discharge Recommendations  Discharge Recommendations/Plan: Undetermined    Treatment Plan  Treatment Plan/Recomm consistencies and controlled amounts. No straws used. Pt would benefit from additional reinforcement of aspiration precautions and safe swallowing strategies on the new upgraded diet.        In Progress         FOLLOW UP  Follow Up Needed: Yes  SLP Follow-u

## 2019-12-05 NOTE — CM/SW NOTE
No insurance on record. Pt referred to Financial services, VM left for Delmis Worrell to evaluate. / to remain available for support and/or discharge planning.      Damaso Donnelly RN    Ext [phone]

## 2019-12-06 ENCOUNTER — TELEPHONE (OUTPATIENT)
Dept: SURGERY | Facility: CLINIC | Age: 59
End: 2019-12-06

## 2019-12-06 DIAGNOSIS — N20.1 LEFT URETERAL STONE: Primary | ICD-10-CM

## 2019-12-06 PROCEDURE — 99233 SBSQ HOSP IP/OBS HIGH 50: CPT | Performed by: HOSPITALIST

## 2019-12-06 PROCEDURE — 99232 SBSQ HOSP IP/OBS MODERATE 35: CPT | Performed by: INTERNAL MEDICINE

## 2019-12-06 PROCEDURE — 99231 SBSQ HOSP IP/OBS SF/LOW 25: CPT | Performed by: UROLOGY

## 2019-12-06 RX ORDER — LORAZEPAM 1 MG/1
1 TABLET ORAL EVERY 4 HOURS PRN
Status: DISCONTINUED | OUTPATIENT
Start: 2019-12-06 | End: 2019-12-07

## 2019-12-06 RX ORDER — POTASSIUM CHLORIDE 20 MEQ/1
40 TABLET, EXTENDED RELEASE ORAL ONCE
Status: COMPLETED | OUTPATIENT
Start: 2019-12-06 | End: 2019-12-06

## 2019-12-06 NOTE — DIETARY NOTE
ADULT NUTRITION BRIEF NOTE/UPDATE:    INITIAL 11/29/19:  Received RN consult r/t screened at nutritional risk d/t reported 5# wt loss. Pt reports appetite/intake WNL until onset of current illness, ~3 days ago. Reported -120#.  Appears well nourished

## 2019-12-06 NOTE — PROGRESS NOTES
[facility]  Hospitalist Progress Note     Manuel Valdivia Patient Status:  Inpatient    10/22/1960  64year old [facility] [id number]   Location -A Attending Dewey Thornton MD   Hosp Day # 8 PCP Nolberto Davila MD     ASSESSMENT/PLAN    E. No distress, wheezes, rales, rhonchi. Abd: Soft, NTND, BS normal, no mass, no HSM, no rebound/guarding. Neuro: Normal reflexes, CN. Sensory/motor exams grossly normal deficit. MS: No joint effusions. No peripheral edema. Skin: Skin is warm and dry. hydrALAzine HCl, Normal Saline Flush, ondansetron HCl, temazepam, Metoclopramide HCl    >35min spent, >50% spent counseling and coordinating care in the form of educating pt/family and d/w consultants and staff.   Most the time spent discussing plan for con

## 2019-12-06 NOTE — PHYSICAL THERAPY NOTE
PHYSICAL THERAPY TREATMENT NOTE - INPATIENT     Room Number: 486/186-I       Presenting Problem: UTI, septic shock, alcohol withdrawal, CIWA, emergent cystoscopy w/ stent insertion on 11/28    Problem List  Principal Problem:    Acute cystitis without hema Fair -           Static Standing: Fair -  Dynamic [address]; Good          AM-PAC '6-Clicks' INPATIENT SHORT FORM - BASIC MOBILITY  How much difficulty does the patient currently have. ..  -   Turning over in bed (including adju railing reciprocal pattern   Goal #5 Patient to demonstrate independence with home activity/exercise instructions provided to patient in preparation for discharge.    Goal #5   Current Status IN PROGRESS   Goal #6     Goal #6  Current Status

## 2019-12-06 NOTE — PROGRESS NOTES
[facility]     Progress Note        Kee Lord Patient Status:  Inpatient    10/22/1960 MRN [id number]   Location [facility] 2W/SW Attending Justice Rubinstein, MD   Hosp Day # 8 PCP Chris Kebede MD       Subjective:   Clinton Kidd Intravenous, Q8H PRN  Thiamine HCl tab 100 mg, 100 mg, Oral, Daily  multivitamin with minerals (ADULT) tab 1 tablet, 1 tablet, Oral, Daily  folic acid (FOLVITE) tab 1 mg, 1 mg, Oral, Daily        Continuous Infusions:       Physical Exam  Constitutional: n pulmonary standpoint. We will sign off.       Elyse Marcelo, DO  Pulmonary [facility]

## 2019-12-06 NOTE — PROGRESS NOTES
[facility]  Urology Consult Follow-Up Note    Mayte Desouza Patient Status:  Inpatient    10/22/1960 MRN [id number]   Location [facility] 5SW/SE Attending Anuja Cantu MD   Hosp Day # 8 PCP Sara Almaguer MD Status: Abnormal    Collection Time: 11/28/19  4:35 PM   Result Value Ref Range    Urine Culture 10,000 - 50,000 CFU/ML Escherichia coli (A) N/A       Susceptibility    Escherichia coli -  (no method available)     Ampicillin >=32 Resistant      Ampicil ROBSON Alves, and Hospitalist Dr. Lamon Babinski.      Yamini Monroy MD  12/6/2019

## 2019-12-06 NOTE — SLP NOTE
SPEECH DAILY NOTE - INPATIENT    ASSESSMENT & PLAN   ASSESSMENT    Pt alert and on room air. Pt seen for swallow analysis per BSE recommendations (after consulting with RN) and upgraded diet on 12/05/19.  Pt observed upright in bed with current diet of [place] GOALS:  Goal #1 The patient will tolerate chopped consistency and nectar liquids without overt signs or symptoms of aspiration with 100 % accuracy over 1 session(s).     No overt clinical signs/symptoms of aspiration on any swallows (no coughing, no throa

## 2019-12-06 NOTE — PLAN OF CARE
Problem: Patient/Family Goals  Goal: Patient/Family Long Term Goal  Description  Patient's Long Term Goal: To feel better, and prevent future admissions.     Interventions:  - Provide education  - Discuss follow-up care  - POC compliance  - See additional neutropenic period  Description  INTERVENTIONS  - Monitor WBC  - Administer growth factors as ordered  - Implement neutropenic guidelines  Outcome: Progressing     Problem: SAFETY ADULT - FALL  Goal: Free from fall injury  Description  INTERVENTIONS:  - As care  Description  Interventions:  - Assess communication ability and preferred communication style  - Implement communication aides and strategies  - Use visual cues when possible  - Listen attentively, be patient, do not interrupt  - Minimize distraction report abnormal signs of bleeding to staff  - Avoid use of toothpicks and dental floss  - Use electric shaver for shaving  - Use soft bristle tooth brush  - Limit straining and forceful nose blowing  Outcome: Progressing     Problem: METABOLIC/FLUID AND EL

## 2019-12-07 VITALS
SYSTOLIC BLOOD PRESSURE: 140 MMHG | DIASTOLIC BLOOD PRESSURE: 78 MMHG | WEIGHT: 111 LBS | BODY MASS INDEX: 20.96 KG/M2 | TEMPERATURE: 98 F | HEIGHT: 61 IN | OXYGEN SATURATION: 92 % | RESPIRATION RATE: 17 BRPM | HEART RATE: 100 BPM

## 2019-12-07 PROCEDURE — 99239 HOSP IP/OBS DSCHRG MGMT >30: CPT | Performed by: HOSPITALIST

## 2019-12-07 RX ORDER — POTASSIUM CHLORIDE 20 MEQ/1
40 TABLET, EXTENDED RELEASE ORAL ONCE
Status: DISCONTINUED | OUTPATIENT
Start: 2019-12-07 | End: 2019-12-07

## 2019-12-07 RX ORDER — CIPROFLOXACIN 500 MG/1
500 TABLET, FILM COATED ORAL 2 TIMES DAILY
Qty: 10 TABLET | Refills: 0 | Status: SHIPPED | OUTPATIENT
Start: 2019-12-07 | End: 2019-12-12

## 2019-12-07 NOTE — PLAN OF CARE
Problem: Patient/Family Goals  Goal: Patient/Family Long Term Goal  Description  Patient's Long Term Goal: To feel better, and prevent future admissions.     Interventions:  - Provide education  - Discuss follow-up care  - POC compliance  - See additional fever/infection during anticipated neutropenic period  Description  INTERVENTIONS  - Monitor WBC  - Administer growth factors as ordered  - Implement neutropenic guidelines  Outcome: Adequate for Discharge     Problem: SAFETY ADULT - FALL  Goal: Free from preferred communication style  - Implement communication aides and strategies  - Use visual cues when possible  - Listen attentively, be patient, do not interrupt  - Minimize distractions  - Allow time for understanding and response  - Establish method for staff  - Avoid use of toothpicks and dental floss  - Use electric shaver for shaving  - Use soft bristle tooth brush  - Limit straining and forceful nose blowing  Outcome: Adequate for Discharge     Problem: METABOLIC/FLUID AND ELECTROLYTES - ADULT  Goal:

## 2019-12-07 NOTE — PROGRESS NOTES
Patient discharged home as ordered. Dr. Stormy Bill stated he spoke with other consults already and patient may leave. No need for patient to strain urine at home per Dr Stormy Bill.  Discharge paperwork reviewed with patient and , they verbalized understanding to

## 2019-12-07 NOTE — DISCHARGE SUMMARY
[facility] HOSPITALIST  DISCHARGE SUMMARY     Sindy Squires Patient Status:  Inpatient    10/22/1960 MRN [id number]   Location [facility] 5SW/SE Attending No att. providers found   [facility] Day # 9 PCP Yeny Guthrie MD     DATE OF ADMISSION:  Mushtaq for stent removal and definitive stone management. Patient understands return to the emergency room for increased pain, fever, discharge, shortness of breath, chest pain, new neurologic symptoms, other concerning symptoms.     PHYSICAL EXAM:  Te 3     Omeprazole 40 MG Cpdr      Take 1 capsule (40 mg total) by mouth daily. Quantity:  90 capsule  Refills:  1     Thiamine HCl 100 MG Tabs      Take 1 tablet (100 mg total) by mouth daily.    Quantity:  90 tablet  Refills:  1           Where to Get You

## 2019-12-09 ENCOUNTER — TELEPHONE (OUTPATIENT)
Dept: INTERNAL MEDICINE CLINIC | Facility: CLINIC | Age: 59
End: 2019-12-09

## 2019-12-09 ENCOUNTER — PATIENT OUTREACH (OUTPATIENT)
Dept: CASE MANAGEMENT | Age: 59
End: 2019-12-09

## 2019-12-09 DIAGNOSIS — Z02.9 ENCOUNTERS FOR ADMINISTRATIVE PURPOSE: ICD-10-CM

## 2019-12-09 DIAGNOSIS — N30.00 ACUTE CYSTITIS WITHOUT HEMATURIA: ICD-10-CM

## 2019-12-09 PROCEDURE — 1111F DSCHRG MED/CURRENT MED MERGE: CPT

## 2019-12-09 NOTE — PROGRESS NOTES
Initial Post Discharge Follow Up   Discharge Date: 12/7/19  Contact Date: 12/9/2019    Consent Verification:  Assessment Completed With: Patient  HIPAA Verified?   Yes    Discharge Dx:   Acute cystitis without hematuria         General:   • How have you capsule 1     • (NCM)  Were there any medication changes noted on AVS?  yes  o If so, were these medication changes discussed with you prior to leaving the hospital? yes  • (NCM) If a new medication was prescribed:    o Was the new medication’s purpose & s medications reviewed/discussed/and reconciled against outpatient medications with patient,  and orders reviewed and discussed. Any changes or updates to medications and or orders sent to PCP.

## 2019-12-09 NOTE — TELEPHONE ENCOUNTER
Patient dc'd 12/7/2019. She is not a TCM. Per dc instructions, she is to follow up with PCP within 1 week. Patient declined appt stating that she is following up with Urology. Please discuss with PCP and follow up with patient accordingly.

## 2019-12-11 ENCOUNTER — TELEPHONE (OUTPATIENT)
Dept: SURGERY | Facility: CLINIC | Age: 59
End: 2019-12-11

## 2019-12-13 ENCOUNTER — TELEPHONE (OUTPATIENT)
Dept: SURGERY | Facility: CLINIC | Age: 59
End: 2019-12-13

## 2019-12-13 RX ORDER — SODIUM CHLORIDE, SODIUM LACTATE, POTASSIUM CHLORIDE, CALCIUM CHLORIDE 600; 310; 30; 20 MG/100ML; MG/100ML; MG/100ML; MG/100ML
INJECTION, SOLUTION INTRAVENOUS CONTINUOUS
Status: CANCELLED | OUTPATIENT
Start: 2019-12-13

## 2019-12-13 NOTE — TELEPHONE ENCOUNTER
S/W Taylor from the hospital pre-service dept who is calling to touch base with [facility] about this pt's sched.  Surgery for 12/17 to find out how urgently this surgery is needed d/t pt's current outstanding hospital bill and pt not wanting to cooperate with set

## 2019-12-13 NOTE — TELEPHONE ENCOUNTER
I s/w ABE and he stated that he was ensured by Valeria Goel at the hospital before pt was discharged that it was OK to schd pt for the surgery as an OP and he would like to go forward with the surgery as schd.      I called Joanna Marie back to inform her of this and sh

## 2019-12-17 ENCOUNTER — HOSPITAL ENCOUNTER (OUTPATIENT)
Facility: HOSPITAL | Age: 59
Setting detail: HOSPITAL OUTPATIENT SURGERY
Discharge: HOME | End: 2019-12-17
Attending: UROLOGY | Admitting: UROLOGY

## 2019-12-17 ENCOUNTER — ANESTHESIA (OUTPATIENT)
Dept: SURGERY | Facility: HOSPITAL | Age: 59
End: 2019-12-17

## 2019-12-17 ENCOUNTER — ANESTHESIA EVENT (OUTPATIENT)
Dept: SURGERY | Facility: HOSPITAL | Age: 59
End: 2019-12-17

## 2019-12-17 ENCOUNTER — APPOINTMENT (OUTPATIENT)
Dept: GENERAL RADIOLOGY | Facility: HOSPITAL | Age: 59
End: 2019-12-17
Attending: UROLOGY

## 2019-12-17 VITALS
WEIGHT: 109 LBS | SYSTOLIC BLOOD PRESSURE: 133 MMHG | HEART RATE: 82 BPM | HEIGHT: 61 IN | RESPIRATION RATE: 18 BRPM | OXYGEN SATURATION: 96 % | TEMPERATURE: 98 F | BODY MASS INDEX: 20.58 KG/M2 | DIASTOLIC BLOOD PRESSURE: 85 MMHG

## 2019-12-17 DIAGNOSIS — N20.1 LEFT URETERAL STONE: ICD-10-CM

## 2019-12-17 PROBLEM — N20.0 KIDNEY STONE ON LEFT SIDE: Status: ACTIVE | Noted: 2019-11-28

## 2019-12-17 PROCEDURE — 52352 CYSTOURETERO W/STONE REMOVE: CPT | Performed by: UROLOGY

## 2019-12-17 PROCEDURE — 0TC78ZZ EXTIRPATION OF MATTER FROM LEFT URETER, VIA NATURAL OR ARTIFICIAL OPENING ENDOSCOPIC: ICD-10-PCS | Performed by: UROLOGY

## 2019-12-17 PROCEDURE — 0TC18ZZ EXTIRPATION OF MATTER FROM LEFT KIDNEY, VIA NATURAL OR ARTIFICIAL OPENING ENDOSCOPIC: ICD-10-PCS | Performed by: UROLOGY

## 2019-12-17 PROCEDURE — 0TP98DZ REMOVAL OF INTRALUMINAL DEVICE FROM URETER, VIA NATURAL OR ARTIFICIAL OPENING ENDOSCOPIC: ICD-10-PCS | Performed by: UROLOGY

## 2019-12-17 RX ORDER — HYDROCODONE BITARTRATE AND ACETAMINOPHEN 5; 325 MG/1; MG/1
2 TABLET ORAL AS NEEDED
Status: DISCONTINUED | OUTPATIENT
Start: 2019-12-17 | End: 2019-12-17

## 2019-12-17 RX ORDER — SODIUM CHLORIDE 9 MG/ML
INJECTION, SOLUTION INTRAVENOUS CONTINUOUS
Status: DISCONTINUED | OUTPATIENT
Start: 2019-12-17 | End: 2019-12-17

## 2019-12-17 RX ORDER — HALOPERIDOL 5 MG/ML
0.25 INJECTION INTRAMUSCULAR ONCE AS NEEDED
Status: DISCONTINUED | OUTPATIENT
Start: 2019-12-17 | End: 2019-12-17

## 2019-12-17 RX ORDER — MORPHINE SULFATE 4 MG/ML
4 INJECTION, SOLUTION INTRAMUSCULAR; INTRAVENOUS EVERY 10 MIN PRN
Status: DISCONTINUED | OUTPATIENT
Start: 2019-12-17 | End: 2019-12-17

## 2019-12-17 RX ORDER — PHENAZOPYRIDINE HYDROCHLORIDE 100 MG/1
100 TABLET, FILM COATED ORAL 3 TIMES DAILY PRN
Qty: 9 TABLET | Refills: 0 | Status: SHIPPED | OUTPATIENT
Start: 2019-12-17 | End: 2021-01-06

## 2019-12-17 RX ORDER — METOCLOPRAMIDE 10 MG/1
10 TABLET ORAL ONCE
Status: DISCONTINUED | OUTPATIENT
Start: 2019-12-17 | End: 2019-12-17 | Stop reason: HOSPADM

## 2019-12-17 RX ORDER — ACETAMINOPHEN 325 MG/1
650 TABLET ORAL EVERY 4 HOURS PRN
Status: DISCONTINUED | OUTPATIENT
Start: 2019-12-17 | End: 2019-12-17

## 2019-12-17 RX ORDER — SODIUM CHLORIDE, SODIUM LACTATE, POTASSIUM CHLORIDE, CALCIUM CHLORIDE 600; 310; 30; 20 MG/100ML; MG/100ML; MG/100ML; MG/100ML
INJECTION, SOLUTION INTRAVENOUS CONTINUOUS PRN
Status: DISCONTINUED | OUTPATIENT
Start: 2019-12-17 | End: 2019-12-17 | Stop reason: SURG

## 2019-12-17 RX ORDER — HYDROMORPHONE HYDROCHLORIDE 1 MG/ML
0.4 INJECTION, SOLUTION INTRAMUSCULAR; INTRAVENOUS; SUBCUTANEOUS EVERY 5 MIN PRN
Status: DISCONTINUED | OUTPATIENT
Start: 2019-12-17 | End: 2019-12-17

## 2019-12-17 RX ORDER — MORPHINE SULFATE 10 MG/ML
6 INJECTION, SOLUTION INTRAMUSCULAR; INTRAVENOUS EVERY 10 MIN PRN
Status: DISCONTINUED | OUTPATIENT
Start: 2019-12-17 | End: 2019-12-17

## 2019-12-17 RX ORDER — ONDANSETRON 2 MG/ML
INJECTION INTRAMUSCULAR; INTRAVENOUS AS NEEDED
Status: DISCONTINUED | OUTPATIENT
Start: 2019-12-17 | End: 2019-12-17 | Stop reason: SURG

## 2019-12-17 RX ORDER — MIDAZOLAM HYDROCHLORIDE 1 MG/ML
INJECTION INTRAMUSCULAR; INTRAVENOUS AS NEEDED
Status: DISCONTINUED | OUTPATIENT
Start: 2019-12-17 | End: 2019-12-17 | Stop reason: SURG

## 2019-12-17 RX ORDER — SODIUM CHLORIDE, SODIUM LACTATE, POTASSIUM CHLORIDE, CALCIUM CHLORIDE 600; 310; 30; 20 MG/100ML; MG/100ML; MG/100ML; MG/100ML
INJECTION, SOLUTION INTRAVENOUS CONTINUOUS
Status: DISCONTINUED | OUTPATIENT
Start: 2019-12-17 | End: 2019-12-17

## 2019-12-17 RX ORDER — LIDOCAINE HYDROCHLORIDE 10 MG/ML
INJECTION, SOLUTION EPIDURAL; INFILTRATION; INTRACAUDAL; PERINEURAL AS NEEDED
Status: DISCONTINUED | OUTPATIENT
Start: 2019-12-17 | End: 2019-12-17 | Stop reason: SURG

## 2019-12-17 RX ORDER — SULFAMETHOXAZOLE AND TRIMETHOPRIM 800; 160 MG/1; MG/1
1 TABLET ORAL 2 TIMES DAILY
Qty: 10 TABLET | Refills: 0 | Status: SHIPPED | OUTPATIENT
Start: 2019-12-17 | End: 2019-12-22

## 2019-12-17 RX ORDER — NALOXONE HYDROCHLORIDE 0.4 MG/ML
80 INJECTION, SOLUTION INTRAMUSCULAR; INTRAVENOUS; SUBCUTANEOUS AS NEEDED
Status: DISCONTINUED | OUTPATIENT
Start: 2019-12-17 | End: 2019-12-17

## 2019-12-17 RX ORDER — PROCHLORPERAZINE EDISYLATE 5 MG/ML
5 INJECTION INTRAMUSCULAR; INTRAVENOUS ONCE AS NEEDED
Status: DISCONTINUED | OUTPATIENT
Start: 2019-12-17 | End: 2019-12-17

## 2019-12-17 RX ORDER — ACETAMINOPHEN 500 MG
1000 TABLET ORAL ONCE
Status: COMPLETED | OUTPATIENT
Start: 2019-12-17 | End: 2019-12-17

## 2019-12-17 RX ORDER — HYDROMORPHONE HYDROCHLORIDE 1 MG/ML
0.6 INJECTION, SOLUTION INTRAMUSCULAR; INTRAVENOUS; SUBCUTANEOUS EVERY 5 MIN PRN
Status: DISCONTINUED | OUTPATIENT
Start: 2019-12-17 | End: 2019-12-17

## 2019-12-17 RX ORDER — DEXAMETHASONE SODIUM PHOSPHATE 4 MG/ML
VIAL (ML) INJECTION AS NEEDED
Status: DISCONTINUED | OUTPATIENT
Start: 2019-12-17 | End: 2019-12-17 | Stop reason: SURG

## 2019-12-17 RX ORDER — HYDROCODONE BITARTRATE AND ACETAMINOPHEN 5; 325 MG/1; MG/1
1 TABLET ORAL EVERY 4 HOURS PRN
Status: DISCONTINUED | OUTPATIENT
Start: 2019-12-17 | End: 2019-12-17

## 2019-12-17 RX ORDER — METOPROLOL TARTRATE 5 MG/5ML
2.5 INJECTION INTRAVENOUS ONCE
Status: DISCONTINUED | OUTPATIENT
Start: 2019-12-17 | End: 2019-12-17

## 2019-12-17 RX ORDER — GLYCOPYRROLATE 0.2 MG/ML
INJECTION, SOLUTION INTRAMUSCULAR; INTRAVENOUS AS NEEDED
Status: DISCONTINUED | OUTPATIENT
Start: 2019-12-17 | End: 2019-12-17 | Stop reason: SURG

## 2019-12-17 RX ORDER — FAMOTIDINE 20 MG/1
20 TABLET ORAL ONCE
Status: DISCONTINUED | OUTPATIENT
Start: 2019-12-17 | End: 2019-12-17 | Stop reason: HOSPADM

## 2019-12-17 RX ORDER — ONDANSETRON 2 MG/ML
4 INJECTION INTRAMUSCULAR; INTRAVENOUS ONCE AS NEEDED
Status: DISCONTINUED | OUTPATIENT
Start: 2019-12-17 | End: 2019-12-17

## 2019-12-17 RX ORDER — PHENYLEPHRINE HCL 10 MG/ML
VIAL (ML) INJECTION AS NEEDED
Status: DISCONTINUED | OUTPATIENT
Start: 2019-12-17 | End: 2019-12-17 | Stop reason: SURG

## 2019-12-17 RX ORDER — HYDROMORPHONE HYDROCHLORIDE 1 MG/ML
0.2 INJECTION, SOLUTION INTRAMUSCULAR; INTRAVENOUS; SUBCUTANEOUS EVERY 5 MIN PRN
Status: DISCONTINUED | OUTPATIENT
Start: 2019-12-17 | End: 2019-12-17

## 2019-12-17 RX ORDER — HYDROCODONE BITARTRATE AND ACETAMINOPHEN 5; 325 MG/1; MG/1
2 TABLET ORAL EVERY 4 HOURS PRN
Status: DISCONTINUED | OUTPATIENT
Start: 2019-12-17 | End: 2019-12-17

## 2019-12-17 RX ORDER — HYDROCODONE BITARTRATE AND ACETAMINOPHEN 5; 325 MG/1; MG/1
1 TABLET ORAL AS NEEDED
Status: DISCONTINUED | OUTPATIENT
Start: 2019-12-17 | End: 2019-12-17

## 2019-12-17 RX ORDER — MORPHINE SULFATE 4 MG/ML
2 INJECTION, SOLUTION INTRAMUSCULAR; INTRAVENOUS EVERY 10 MIN PRN
Status: DISCONTINUED | OUTPATIENT
Start: 2019-12-17 | End: 2019-12-17

## 2019-12-17 RX ORDER — PHENAZOPYRIDINE HYDROCHLORIDE 200 MG/1
200 TABLET, FILM COATED ORAL ONCE AS NEEDED
Status: DISCONTINUED | OUTPATIENT
Start: 2019-12-17 | End: 2019-12-17

## 2019-12-17 RX ADMIN — LIDOCAINE HYDROCHLORIDE 25 MG: 10 INJECTION, SOLUTION EPIDURAL; INFILTRATION; INTRACAUDAL; PERINEURAL at 14:59:00

## 2019-12-17 RX ADMIN — PHENYLEPHRINE HCL 40 MCG: 10 MG/ML VIAL (ML) INJECTION at 15:14:00

## 2019-12-17 RX ADMIN — DEXAMETHASONE SODIUM PHOSPHATE 4 MG: 4 MG/ML VIAL (ML) INJECTION at 14:59:00

## 2019-12-17 RX ADMIN — SODIUM CHLORIDE, SODIUM LACTATE, POTASSIUM CHLORIDE, CALCIUM CHLORIDE: 600; 310; 30; 20 INJECTION, SOLUTION INTRAVENOUS at 14:57:00

## 2019-12-17 RX ADMIN — MIDAZOLAM HYDROCHLORIDE 2 MG: 1 INJECTION INTRAMUSCULAR; INTRAVENOUS at 14:56:00

## 2019-12-17 RX ADMIN — ONDANSETRON 4 MG: 2 INJECTION INTRAMUSCULAR; INTRAVENOUS at 14:59:00

## 2019-12-17 RX ADMIN — SODIUM CHLORIDE, SODIUM LACTATE, POTASSIUM CHLORIDE, CALCIUM CHLORIDE: 600; 310; 30; 20 INJECTION, SOLUTION INTRAVENOUS at 15:57:00

## 2019-12-17 RX ADMIN — PHENYLEPHRINE HCL 40 MCG: 10 MG/ML VIAL (ML) INJECTION at 15:07:00

## 2019-12-17 RX ADMIN — GLYCOPYRROLATE 0.2 MG: 0.2 INJECTION, SOLUTION INTRAMUSCULAR; INTRAVENOUS at 14:59:00

## 2019-12-17 NOTE — ANESTHESIA POSTPROCEDURE EVALUATION
Patient: Asad Sarah    Procedure Summary     Date:  12/17/19 Room / Location:  [address] [place] [address] MAIN OR 14 / [address] [place] [address] MAIN OR    Anesthesia Start:  7086 Anesthesia Stop:  9794    Procedures:       CYSTOSCOPY RETROGRADE (Left )      CYSTOSCOPY URETEROSCOPY (Left ) Diag

## 2019-12-17 NOTE — H&P
History & Physical Examination    Patient Name: Patt Dinh  MRN: [id number]  [facility]: [id number]  YOB: 1960    Diagnosis: left ureteral stone.      Present Illness: 57-year-old female who presents to the hospital with septic shock secondary Years: 25.00        Pack years: 12.5      Smokeless tobacco: Never Used    Alcohol use: Yes      Drinks per session: 7 to 9      Binge frequency: Daily or almost daily      Comment: hard liquor - 2 drinks, seldom      SYSTEM Check if Review is Normal Check

## 2019-12-17 NOTE — ANESTHESIA PREPROCEDURE EVALUATION
Anesthesia PreOp Note    HPI:     Luis Carlos Saldaña is a 64year old female who presents for preoperative consultation requested by: Mary Hilton MD    Date of Surgery: 12/17/2019    Procedure(s):  CYSTOSCOPY RETROGRADE  CYSTOSCOPY URETEROSCOPY  LASER Tartrate 25 MG Oral Tab, Take 1 tablet (25 mg total) by mouth 2x Daily(Beta Blocker). , Disp: 60 tablet, Rfl: 0, 12/17/2019 at 0900  LORAZEPAM 1 MG Oral Tab, TAKE 1 TABLET BY MOUTH ONCE DAILY AS NEEDED FOR ANXIETY, Disp: 30 tablet, Rfl: 0, 12/10/2019  Eileen insecurity:        Worry: Not on file        Inability: Not on file      Transportation needs:        Medical: Not on file        Non-medical: Not on file    Tobacco Use      Smoking status: Current Every Day Smoker        Packs/day: 0.50        Years: 22. (L) 12/06/2019    .3 (H) 12/06/2019    MCH 40.4 (H) 12/06/2019    MCHC 33.0 12/06/2019    RDW 13.3 12/06/2019    .0 12/06/2019     Lab Results   Component Value Date     12/06/2019    K 3.8 12/07/2019     12/06/2019    CO2 24.0 12 questions were answered to the best of my ability. The patient desires the anesthetic management as planned.   Gosia DUNCAN  12/17/2019 3:26 PM

## 2019-12-17 NOTE — OPERATIVE REPORT
[facility] HOSP - [facility]   Urology Operative Report     Da[place] Location: OR   CSN [id number] MRN [id number]   Admission Date 12/17/2019 Operation Date 12/17/2019   Service Urology Surgeon Usman Aguilera MD      Primary Surgeon: Usman Aguilera pressure areas and bony prominences padded appropriately. The surgical site was prepped and draped in standard sterile fashion. A full surgical timeout was performed with agreement upon all of its components.     A 22 [place] rigid cystoscope with a 30 degr did not reveal any evidence of bleeding, stones, or tumors. The safety wire was then removed. The bladder was drained and the procedure was concluded.   The patient was repositioned supine, general anesthesia was reversed, she was successfully extubated a

## 2019-12-17 NOTE — ANESTHESIA PROCEDURE NOTES
Airway    General Information and Staff    Patient location during procedure: OR  Anesthesiologist: Sara Ibarra MD  Performed: anesthesiologist     Indications and Patient Condition  Indications for airway management: anesthesia  Spontaneous Venti

## 2020-01-20 ENCOUNTER — TELEPHONE (OUTPATIENT)
Dept: SURGERY | Facility: CLINIC | Age: 60
End: 2020-01-20

## 2020-01-21 NOTE — TELEPHONE ENCOUNTER
Stone analysis reports calcium stones. Pt contacted to schedule follow up per result note from Dr. Landry Cline. She states she cannot come in because she does not have insurance. Dr. Landry Cline please advise on recommendations.

## 2020-01-22 NOTE — TELEPHONE ENCOUNTER
Calcium stone composition. Please mail her a copy of the stone friendly diet recommendations. Mainly increase hydration, avoid excessive salt in the diet, green leafy vegetables and dark chocolate. May schedule follow-up as needed.

## 2021-01-06 ENCOUNTER — OFFICE VISIT (OUTPATIENT)
Dept: INTERNAL MEDICINE CLINIC | Facility: CLINIC | Age: 61
End: 2021-01-06
Payer: COMMERCIAL

## 2021-01-06 VITALS
DIASTOLIC BLOOD PRESSURE: 86 MMHG | HEART RATE: 98 BPM | HEIGHT: 61 IN | WEIGHT: 122.63 LBS | BODY MASS INDEX: 23.15 KG/M2 | RESPIRATION RATE: 18 BRPM | SYSTOLIC BLOOD PRESSURE: 132 MMHG

## 2021-01-06 DIAGNOSIS — Z72.0 TOBACCO ABUSE: ICD-10-CM

## 2021-01-06 DIAGNOSIS — F10.11 HISTORY OF ETOH ABUSE: ICD-10-CM

## 2021-01-06 DIAGNOSIS — Z87.442 HISTORY OF KIDNEY STONES: ICD-10-CM

## 2021-01-06 DIAGNOSIS — Z12.11 COLON CANCER SCREENING: ICD-10-CM

## 2021-01-06 DIAGNOSIS — Z00.00 ROUTINE PHYSICAL EXAMINATION: Primary | ICD-10-CM

## 2021-01-06 DIAGNOSIS — L72.9 CYST OF SKIN: ICD-10-CM

## 2021-01-06 DIAGNOSIS — Z12.31 ENCOUNTER FOR SCREENING MAMMOGRAM FOR BREAST CANCER: ICD-10-CM

## 2021-01-06 PROCEDURE — 3079F DIAST BP 80-89 MM HG: CPT | Performed by: INTERNAL MEDICINE

## 2021-01-06 PROCEDURE — 3008F BODY MASS INDEX DOCD: CPT | Performed by: INTERNAL MEDICINE

## 2021-01-06 PROCEDURE — 3075F SYST BP GE 130 - 139MM HG: CPT | Performed by: INTERNAL MEDICINE

## 2021-01-06 PROCEDURE — 99396 PREV VISIT EST AGE 40-64: CPT | Performed by: INTERNAL MEDICINE

## 2021-01-06 RX ORDER — LORAZEPAM 1 MG/1
1 TABLET ORAL
Qty: 20 TABLET | Refills: 0 | Status: SHIPPED | OUTPATIENT
Start: 2021-01-06

## 2021-01-06 NOTE — PROGRESS NOTES
HPI:    Patient ID: Juana Vital is a 64year old female. HPI  Patient is here requesting physical exam.  This is the first time that I have seen her. She was seen previously by mother internal medicine doctors.   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       Past Surgical History:   Procedure Laterality Date   • CYSTOSCOPY RETROGRADE Left 12/17/2019    Performed by Aleksandar Monzon MD at [facility] OR   • CYSTOSCOPY S Negative for weakness, numbness and headaches. Hematological: Does not bruise/bleed easily. Psychiatric/Behavioral: Positive for depressed mood. The patient is nervous/anxious.              Current Outpatient Medications   Medication Sig Dispense Refill subcutaneous cystic lesion   Psychiatric: She has a normal mood and affect.  Her behavior is normal. Judgment and thought content normal.       Wt Readings from Last 6 Encounters:  01/06/21 : 122 lb 9.6 oz (55.6 kg)  12/17/19 : 109 lb (49.4 kg)  11/28/19 : abstinence. 7. Cyst of skin  Benign-appearing cyst on the left foot.   Refer to podiatry for possible evaluation and drainage or removal.  - PODIATRY - INTERNAL         Meds This Visit:  Requested Prescriptions     Pending Prescriptions Disp Refills   •

## 2021-01-14 ENCOUNTER — LAB ENCOUNTER (OUTPATIENT)
Dept: LAB | Facility: HOSPITAL | Age: 61
End: 2021-01-14
Attending: INTERNAL MEDICINE
Payer: COMMERCIAL

## 2021-01-14 DIAGNOSIS — Z00.00 ROUTINE PHYSICAL EXAMINATION: ICD-10-CM

## 2021-01-14 LAB
ALBUMIN SERPL-MCNC: 4 G/DL (ref 3.4–5)
ALBUMIN/GLOB SERPL: 1.1 {RATIO} (ref 1–2)
ALP LIVER SERPL-CCNC: 122 U/L
ALT SERPL-CCNC: 20 U/L
ANION GAP SERPL CALC-SCNC: 9 MMOL/L (ref 0–18)
AST SERPL-CCNC: 15 U/L (ref 15–37)
BASOPHILS # BLD AUTO: 0.04 X10(3) UL (ref 0–0.2)
BASOPHILS NFR BLD AUTO: 0.4 %
BILIRUB SERPL-MCNC: 0.5 MG/DL (ref 0.1–2)
BUN BLD-MCNC: 10 MG/DL (ref 7–18)
BUN/CREAT SERPL: 10.3 (ref 10–20)
CALCIUM BLD-MCNC: 9.5 MG/DL (ref 8.5–10.1)
CHLORIDE SERPL-SCNC: 106 MMOL/L (ref 98–112)
CHOLEST SMN-MCNC: 232 MG/DL (ref ?–200)
CO2 SERPL-SCNC: 24 MMOL/L (ref 21–32)
CREAT BLD-MCNC: 0.97 MG/DL
DEPRECATED RDW RBC AUTO: 49.2 FL (ref 35.1–46.3)
EOSINOPHIL # BLD AUTO: 0.03 X10(3) UL (ref 0–0.7)
EOSINOPHIL NFR BLD AUTO: 0.3 %
ERYTHROCYTE [DISTWIDTH] IN BLOOD BY AUTOMATED COUNT: 14.1 % (ref 11–15)
GLOBULIN PLAS-MCNC: 3.7 G/DL (ref 2.8–4.4)
GLUCOSE BLD-MCNC: 129 MG/DL (ref 70–99)
HCT VFR BLD AUTO: 44.7 %
HDLC SERPL-MCNC: 64 MG/DL (ref 40–59)
HGB BLD-MCNC: 15.1 G/DL
IMM GRANULOCYTES # BLD AUTO: 0.04 X10(3) UL (ref 0–1)
IMM GRANULOCYTES NFR BLD: 0.4 %
LDLC SERPL CALC-MCNC: 148 MG/DL (ref ?–100)
LYMPHOCYTES # BLD AUTO: 2.63 X10(3) UL (ref 1–4)
LYMPHOCYTES NFR BLD AUTO: 23.3 %
M PROTEIN MFR SERPL ELPH: 7.7 G/DL (ref 6.4–8.2)
MCH RBC QN AUTO: 32.1 PG (ref 26–34)
MCHC RBC AUTO-ENTMCNC: 33.8 G/DL (ref 31–37)
MCV RBC AUTO: 95.1 FL
MONOCYTES # BLD AUTO: 0.52 X10(3) UL (ref 0.1–1)
MONOCYTES NFR BLD AUTO: 4.6 %
NEUTROPHILS # BLD AUTO: 8.04 X10 (3) UL (ref 1.5–7.7)
NEUTROPHILS # BLD AUTO: 8.04 X10(3) UL (ref 1.5–7.7)
NEUTROPHILS NFR BLD AUTO: 71 %
NONHDLC SERPL-MCNC: 168 MG/DL (ref ?–130)
OSMOLALITY SERPL CALC.SUM OF ELEC: 289 MOSM/KG (ref 275–295)
PATIENT FASTING Y/N/NP: YES
PATIENT FASTING Y/N/NP: YES
PLATELET # BLD AUTO: 311 10(3)UL (ref 150–450)
POTASSIUM SERPL-SCNC: 3.7 MMOL/L (ref 3.5–5.1)
RBC # BLD AUTO: 4.7 X10(6)UL
SODIUM SERPL-SCNC: 139 MMOL/L (ref 136–145)
TRIGL SERPL-MCNC: 98 MG/DL (ref 30–149)
TSI SER-ACNC: 1.01 MIU/ML (ref 0.36–3.74)
VIT B12 SERPL-MCNC: 936 PG/ML (ref 193–986)
VLDLC SERPL CALC-MCNC: 20 MG/DL (ref 0–30)
WBC # BLD AUTO: 11.3 X10(3) UL (ref 4–11)

## 2021-01-14 PROCEDURE — 36415 COLL VENOUS BLD VENIPUNCTURE: CPT

## 2021-01-14 PROCEDURE — 82607 VITAMIN B-12: CPT

## 2021-01-14 PROCEDURE — 85025 COMPLETE CBC W/AUTO DIFF WBC: CPT

## 2021-01-14 PROCEDURE — 80061 LIPID PANEL: CPT

## 2021-01-14 PROCEDURE — 80053 COMPREHEN METABOLIC PANEL: CPT

## 2021-01-14 PROCEDURE — 84443 ASSAY THYROID STIM HORMONE: CPT

## 2021-01-18 ENCOUNTER — HOSPITAL ENCOUNTER (OUTPATIENT)
Dept: MAMMOGRAPHY | Facility: HOSPITAL | Age: 61
Discharge: HOME | End: 2021-01-18
Attending: INTERNAL MEDICINE
Payer: COMMERCIAL

## 2021-01-18 DIAGNOSIS — Z12.31 ENCOUNTER FOR SCREENING MAMMOGRAM FOR BREAST CANCER: ICD-10-CM

## 2021-01-18 PROCEDURE — 77067 SCR MAMMO BI INCL CAD: CPT | Performed by: INTERNAL MEDICINE

## 2021-01-18 PROCEDURE — 77063 BREAST TOMOSYNTHESIS BI: CPT | Performed by: INTERNAL MEDICINE

## 2021-02-02 ENCOUNTER — HOSPITAL ENCOUNTER (OUTPATIENT)
Dept: MAMMOGRAPHY | Facility: HOSPITAL | Age: 61
Discharge: HOME | End: 2021-02-02
Attending: INTERNAL MEDICINE
Payer: COMMERCIAL

## 2021-02-02 DIAGNOSIS — R92.8 ABNORMAL MAMMOGRAM: ICD-10-CM

## 2021-02-02 PROCEDURE — 77065 DX MAMMO INCL CAD UNI: CPT | Performed by: INTERNAL MEDICINE

## 2021-02-02 PROCEDURE — 77061 BREAST TOMOSYNTHESIS UNI: CPT | Performed by: INTERNAL MEDICINE

## 2021-03-17 ENCOUNTER — OFFICE VISIT (OUTPATIENT)
Dept: OBGYN CLINIC | Facility: CLINIC | Age: 61
End: 2021-03-17
Payer: COMMERCIAL

## 2021-03-17 VITALS
SYSTOLIC BLOOD PRESSURE: 143 MMHG | HEART RATE: 84 BPM | DIASTOLIC BLOOD PRESSURE: 85 MMHG | WEIGHT: 124 LBS | BODY MASS INDEX: 23 KG/M2

## 2021-03-17 DIAGNOSIS — Z12.31 SCREENING MAMMOGRAM, ENCOUNTER FOR: ICD-10-CM

## 2021-03-17 DIAGNOSIS — Z01.419 ENCOUNTER FOR GYNECOLOGICAL EXAMINATION WITHOUT ABNORMAL FINDING: Primary | ICD-10-CM

## 2021-03-17 DIAGNOSIS — Z12.4 SCREENING FOR MALIGNANT NEOPLASM OF CERVIX: ICD-10-CM

## 2021-03-17 PROCEDURE — 3077F SYST BP >= 140 MM HG: CPT | Performed by: OBSTETRICS & GYNECOLOGY

## 2021-03-17 PROCEDURE — 3079F DIAST BP 80-89 MM HG: CPT | Performed by: OBSTETRICS & GYNECOLOGY

## 2021-03-17 PROCEDURE — 99396 PREV VISIT EST AGE 40-64: CPT | Performed by: OBSTETRICS & GYNECOLOGY

## 2021-03-18 LAB — HPV I/H RISK 1 DNA SPEC QL NAA+PROBE: NEGATIVE

## 2021-03-20 NOTE — PROGRESS NOTES
HPI/Subjective:   Patient ID: Nicola Acosta is a 64year old female. HPI  Nulligravida and  with LMP in early 42's. ROS significant for nocturia and occasional urge incontinence without pad use.  She is slowly weaning tobacco use, 1/2 PPD from 1 PP Genitourinary:     Labia:         Right: No rash or lesion. Left: No rash or lesion. Vagina: Normal. No vaginal discharge. Cervix: No cervical motion tenderness or discharge. Uterus: Not enlarged and not tender.        Adnexa:

## 2022-05-21 ENCOUNTER — TELEPHONE (OUTPATIENT)
Dept: INTERNAL MEDICINE CLINIC | Facility: CLINIC | Age: 62
End: 2022-05-21

## 2022-05-21 NOTE — TELEPHONE ENCOUNTER
Please send letter    Screening for colon cancer saves lives. You are due for your colonoscopy. An order has been placed to make an appointment with our gastroenterologist team. Please call [phone]. If you are reluctant to have this procedure please  a FIT card from the lab and submit a stool sample that also screens for colon cancer.     PRIYANKA Mar  Working with Orquidea Lea

## (undated) DEVICE — CYSTO PACK: Brand: MEDLINE INDUSTRIES, INC.

## (undated) DEVICE — GAMMEX® PI HYBRID SIZE 7.5, STERILE POWDER-FREE SURGICAL GLOVE, POLYISOPRENE AND NEOPRENE BLEND: Brand: GAMMEX

## (undated) DEVICE — SOLO FLEX HYBRID GUIDEWIRE .03

## (undated) DEVICE — Device

## (undated) DEVICE — SOL  .9 3000ML

## (undated) DEVICE — 9534HP TRANSPARENT DRSG W/FRAME: Brand: 3M™ TEGADERM™

## (undated) DEVICE — ISOVUE 300 10X100ML VIAL

## (undated) DEVICE — 3M™ STERI-STRIP™ COMPOUND BENZOIN TINCTURE 40 BAGS/CARTON 4 CARTONS/CASE C1544: Brand: 3M™ STERI-STRIP™

## (undated) DEVICE — NITINOL STONE RETRIEVAL BASKET: Brand: ZERO TIP

## (undated) DEVICE — TOWEL OR BLU 16X26 STRL

## (undated) DEVICE — SOL  .9 1000ML BTL

## (undated) DEVICE — ENDOSCOPIC VALVE WITH ADAPTER.: Brand: SURSEAL® II

## (undated) DEVICE — SOL H2O 1000ML BTL

## (undated) DEVICE — OPEN-END URETERAL CATHETER SOF-FLEX: Brand: SOF-FLEX

## (undated) DEVICE — SOLO HYBRID WIRE 35 FLEX STR

## (undated) DEVICE — CONTAINER SPEC STR 4OZ GRY LID

## (undated) DEVICE — TIGERTAIL 5F FLXTIP 70CM

## (undated) DEVICE — MEDI-VAC NON-CONDUCTIVE SUCTION TUBING: Brand: CARDINAL HEALTH

## (undated) DEVICE — TECH FEE LASER HOLMIUM LOW WAT

## (undated) DEVICE — UROLOGY DRAIN BAG

## (undated) NOTE — LETTER
[address] [place] [address] Rd, [place], [place]     AUTHORIZATION FOR SURGICAL OPERATION OR PROCEDURE    I hereby authorize Dr. Juan Chávez MD, my Physician(s) and whomever may be designated as the doctor's Assistant, to perform the fo 4. I consent to the photographing of procedure(s) to be performed for the purposes of advancing medicine, science and/or education, provided my identity is not revealed.  If the procedure has been videotaped, the physician/surgeon will obtain the original v (Witness signature)                                                                                                  (Date)                                (Time)  STATEMENT OF PHYSICIAN My signature below affirms that prior to the time of the procedure;  I

## (undated) NOTE — LETTER
[facility]  Administration of Anesthesia  1. I, Mirlande Snell, or _________________________________ acting on her behalf, (Patient) (Dependent/Representative) request to receive anesthesia for my pending procedure/operation/treatment.   A infections, high spinal block, spinal bleeding, seizure, cardiac arrest and death. 7. AWARENESS: I understand that it is possible (but unlikely) to have explicit memory of events from the operating room while under general anesthesia.   8. ELECTROCONVULSIV unconscious pt /Relationship    My signature below affirms that prior to the time of the procedure, I have explained to the patient and/or his/her guardian, the risks and benefits of undergoing anesthesia, as well as any reasonable alternatives.     _______